# Patient Record
Sex: FEMALE | Race: WHITE | NOT HISPANIC OR LATINO | Employment: FULL TIME | URBAN - NONMETROPOLITAN AREA
[De-identification: names, ages, dates, MRNs, and addresses within clinical notes are randomized per-mention and may not be internally consistent; named-entity substitution may affect disease eponyms.]

---

## 2018-09-27 ENCOUNTER — OFFICE VISIT CONVERTED (OUTPATIENT)
Dept: FAMILY MEDICINE CLINIC | Age: 33
End: 2018-09-27
Attending: NURSE PRACTITIONER

## 2019-02-08 ENCOUNTER — HOSPITAL ENCOUNTER (OUTPATIENT)
Dept: OTHER | Facility: HOSPITAL | Age: 34
Discharge: HOME | End: 2019-02-08

## 2019-02-08 LAB — TSH SERPL-ACNC: 3.43 M[IU]/L (ref 0.27–4.2)

## 2020-09-11 ENCOUNTER — HOSPITAL ENCOUNTER (OUTPATIENT)
Dept: OTHER | Facility: HOSPITAL | Age: 35
Discharge: HOME | End: 2020-09-11

## 2020-09-11 LAB
APPEARANCE UR: CLEAR
BASOPHILS # BLD MANUAL: 0.03 10*3/UL (ref 0–0.2)
BASOPHILS NFR BLD MANUAL: 0.4 % (ref 0–3)
BILIRUB UR QL: NEGATIVE
COLOR UR: YELLOW
CONV BACTERIA: ABNORMAL
CONV COLLECTION SOURCE (UA): ABNORMAL
CONV UROBILINOGEN IN URINE BY AUTOMATED TEST STRIP: 0.2 {EHRLICHU}/DL (ref 0.1–1)
DEPRECATED RDW RBC AUTO: 38.8 FL
EOSINOPHIL # BLD MANUAL: 0.13 10*3/UL (ref 0–0.7)
EOSINOPHIL NFR BLD MANUAL: 1.6 % (ref 0–7)
ERYTHROCYTE [DISTWIDTH] IN BLOOD BY AUTOMATED COUNT: 12.1 % (ref 11.5–14.5)
GLUCOSE UR QL: NEGATIVE MG/DL
GRANS (ABSOLUTE): 4.91 10*3/UL (ref 2–8)
GRANS: 62.3 % (ref 30–85)
HBA1C MFR BLD: 14.6 G/DL (ref 12–16)
HCT VFR BLD AUTO: 43.3 % (ref 37–47)
HGB UR QL STRIP: NEGATIVE
IMM GRANULOCYTES # BLD: 0.01 10*3/UL (ref 0–0.54)
IMM GRANULOCYTES NFR BLD: 0.1 % (ref 0–0.43)
KETONES UR QL STRIP: ABNORMAL MG/DL
LEUKOCYTE ESTERASE UR QL STRIP: NEGATIVE
LYMPHOCYTES # BLD MANUAL: 2.32 10*3/UL (ref 1–5)
LYMPHOCYTES NFR BLD MANUAL: 6.2 % (ref 3–10)
MCH RBC QN AUTO: 29.1 PG (ref 27–31)
MCHC RBC AUTO-ENTMCNC: 33.7 G/DL (ref 33–37)
MCV RBC AUTO: 86.3 FL (ref 81–99)
MONOCYTES # BLD AUTO: 0.49 10*3/UL (ref 0.2–1.2)
NITRITE UR QL STRIP: NEGATIVE
PH UR STRIP.AUTO: 6 [PH] (ref 5–8)
PLATELET # BLD AUTO: 288 10*3/UL (ref 130–400)
PMV BLD AUTO: 10.1 FL (ref 7.4–10.4)
PROT UR QL: NEGATIVE MG/DL
RBC # BLD AUTO: 5.02 10*6/UL (ref 4.2–5.4)
RBC #/AREA URNS HPF: ABNORMAL /[HPF]
SP GR UR: 1.01 (ref 1–1.03)
SQUAMOUS SPT QL MICRO: ABNORMAL /[HPF]
VARIANT LYMPHS NFR BLD MANUAL: 29.4 % (ref 20–45)
WBC # BLD AUTO: 7.89 10*3/UL (ref 4.8–10.8)
WBC #/AREA URNS HPF: ABNORMAL /[HPF]

## 2020-09-12 LAB
25(OH)D3 SERPL-MCNC: 34.1 NG/ML (ref 30–100)
ALBUMIN SERPL-MCNC: 5 G/DL (ref 3.5–5)
ALBUMIN/GLOB SERPL: 1.9 {RATIO} (ref 1.4–2.6)
ALP SERPL-CCNC: 93 U/L (ref 42–98)
ALT SERPL-CCNC: 22 U/L (ref 10–40)
ANION GAP SERPL CALC-SCNC: 17 MMOL/L (ref 8–19)
AST SERPL-CCNC: 21 U/L (ref 15–50)
BILIRUB SERPL-MCNC: 0.54 MG/DL (ref 0.2–1.3)
BUN SERPL-MCNC: 9 MG/DL (ref 5–25)
BUN/CREAT SERPL: 11 {RATIO} (ref 6–20)
CALCIUM SERPL-MCNC: 9.6 MG/DL (ref 8.7–10.4)
CHLORIDE SERPL-SCNC: 99 MMOL/L (ref 99–111)
CHOLEST SERPL-MCNC: 184 MG/DL (ref 107–200)
CHOLEST/HDLC SERPL: 3.3 {RATIO} (ref 3–6)
CONV CO2: 23 MMOL/L (ref 22–32)
CONV TOTAL PROTEIN: 7.7 G/DL (ref 6.3–8.2)
CREAT UR-MCNC: 0.79 MG/DL (ref 0.5–0.9)
EST. AVERAGE GLUCOSE BLD GHB EST-MCNC: 103 MG/DL
FOLATE SERPL-MCNC: 15.3 NG/ML (ref 4.8–20)
GFR SERPLBLD BASED ON 1.73 SQ M-ARVRAT: >60 ML/MIN/{1.73_M2}
GLOBULIN UR ELPH-MCNC: 2.7 G/DL (ref 2–3.5)
GLUCOSE SERPL-MCNC: 78 MG/DL (ref 65–99)
HBA1C MFR BLD: 5.2 % (ref 3.5–5.7)
HDLC SERPL-MCNC: 55 MG/DL (ref 40–60)
LDLC SERPL CALC-MCNC: 114 MG/DL (ref 70–100)
OSMOLALITY SERPL CALC.SUM OF ELEC: 278 MOSM/KG (ref 273–304)
PHOSPHATE SERPL-MCNC: 3.7 MG/DL (ref 2.4–4.5)
POTASSIUM SERPL-SCNC: 3.9 MMOL/L (ref 3.5–5.3)
SODIUM SERPL-SCNC: 135 MMOL/L (ref 135–147)
T4 FREE SERPL-MCNC: 1 NG/DL (ref 0.9–1.8)
TRIGL SERPL-MCNC: 73 MG/DL (ref 40–150)
TSH SERPL-ACNC: 2.31 M[IU]/L (ref 0.27–4.2)
VIT B12 SERPL-MCNC: 546 PG/ML (ref 211–911)
VLDLC SERPL-MCNC: 15 MG/DL (ref 5–37)

## 2020-09-13 LAB — T3FREE SERPL-MCNC: 3 PG/ML (ref 2–4.4)

## 2021-05-18 NOTE — PROGRESS NOTES
Leah Isabel N. 1985     Office/Outpatient Visit    Visit Date: Thu, Sep 27, 2018 04:50 pm    Provider: Mary Granado N.P. (Assistant: Viridiana Abreu RN)    Location: [facility]        Electronically signed by Mary Granado N.P. on  2018 12:30:37 PM                             SUBJECTIVE:        CC:     Emily is a 32 year old White female.  Neck pain;         HPI:         Spinal stenosis: Pt states having an MRI in  d/t neck pain. States symptoms have gotten worse, visual disturbances with seeing stars. Pain is  8/10 pain when resting and feeling like her neck tightens up. States when she is sitting and working on paperwork, her neck feels tight and it drives her crazy.  Pt states having to put pressure to the base of her skull when looking up or when washing her hair. States previously when she would raise her R arm, she would have vision changes seeing stars. Now it is random, cannot identify a trigger.     ROS:     CONSTITUTIONAL:  Negative for chills, fatigue, fever, and weight change.      EYES:  Negative for blurred vision.      CARDIOVASCULAR:  Negative for chest pain, orthopnea, paroxysmal nocturnal dyspnea and pedal edema.      RESPIRATORY:  Negative for dyspnea.      GASTROINTESTINAL:  Negative for abdominal pain, constipation, diarrhea, nausea and vomiting.      MUSCULOSKELETAL:  Positive for neck pain.      NEUROLOGICAL:  Negative for dizziness, headaches, paresthesias, and weakness.      PSYCHIATRIC:  Negative for anxiety, depression, and sleep disturbances.          PMH/FMH/SH:     Last Reviewed on 2018 05:40 PM by Mary Granado    Past Medical History:             GYNECOLOGICAL HISTORY:             Surgical History:     NONE         Family History:         Positive for Coronary Artery Disease and Hypertension.      Positive for Breast Cancer.      Positive for Cerebrovascular Accident ( pat. GM ) and Seizure Disorder.      Positive for  Hypothyroidism.          Social History:     Occupation: a [employer]     Marital Status:          Tobacco/Alcohol/Supplements:     Last Reviewed on 9/27/2018 05:40 PM by Mary Granado    Tobacco: She has never smoked.          Alcohol:  Does not drink alcohol and never has.      Caffeine:  She admits to consuming caffeine via soda ( 1 serving per day ).          Substance Abuse History:     Last Reviewed on 9/27/2018 05:40 PM by Mary Granado        Mental Health History:     Last Reviewed on 9/27/2018 05:40 PM by Mary Granado        Communicable Diseases (eg STDs):     Last Reviewed on 9/27/2018 05:40 PM by Mary Granado            Current Problems:     Last Reviewed on 9/27/2018 05:40 PM by Mary Granado    Bilateral polycystic ovarian syndrome     Use of high risk medications     Overweight         Immunizations:     Fluzone (3 + years dose) 11/1/2017         Allergies:     Last Reviewed on 9/27/2018 05:40 PM by Mary Granado    Amoxicillin:        Current Medications:     Last Reviewed on 9/27/2018 05:40 PM by Mary Granado    None        OBJECTIVE:        Vitals:         Current: 9/27/2018 4:53:56 PM    Ht:  5 ft, 2.5 in;  Wt: 164.6 lbs;  BMI: 29.6    T: 98.5 F (oral);  BP: 132/83 mm Hg (left arm, sitting);  P: 83 bpm (left arm (BP Cuff), sitting);  sCr: 0.77 mg/dL;  GFR: 103.80        Exams:     PHYSICAL EXAM:     GENERAL: vital signs recorded - well developed, well nourished;  no apparent distress;     EYES: extraocular movements intact; conjunctiva and cornea are normal; PERRLA;     NECK: range of motion is normal; thyroid is non-palpable;     RESPIRATORY: normal respiratory rate and pattern with no distress; normal breath sounds with no rales, rhonchi, wheezes or rubs;     CARDIOVASCULAR: normal rate; rhythm is regular;  no systolic murmur; no edema;     GASTROINTESTINAL: nontender; normal bowel sounds; no organomegaly;     MUSCULOSKELETAL: Neck tenderness, limited ROM;      NEUROLOGICAL:  cranial nerves, motor and sensory function, reflexes, gait and coordination are all intact;     PSYCHIATRIC:  appropriate affect and demeanor; normal speech pattern; grossly normal memory;         ASSESSMENT:           723.0   M48.02  Cervical spinal stenosis              DDx:     V77.91   Z13.220  Screening for hyperlipidemia              DDx:         ORDERS:         Meds Prescribed:       Naproxen 500mg Tablet 1 prn  #30 (Thirty) tablet(s) Refills: 1       Medrol (Methylprednisolone) 4mg Dosepak Take as directed with food  #1 (One) dose pack Refills: 0         Radiology/Test Orders:       66522  Magnetic resonance imaging, spinal canal and contents, cervical; without contrast  (Send-Out)           Lab Orders:       [id number]  [facility] CBC with 3 part diff  (Send-Out)         59247  [facility] Comp. Metabolic Panel  (Send-Out)         40264  [facility] TSH  (Send-Out)         78426  [facility] Lipid Panel  (Send-Out)                   PLAN:          Cervical spinal stenosis     LABORATORY:  Labs ordered to be performed today include CBC, Comprehensive metabolic panel, and TSH.      RADIOLOGY:  I have ordered MRI cervical spine w/o contrast to be done today.            Prescriptions:       Naproxen 500mg Tablet 1 prn  #30 (Thirty) tablet(s) Refills: 1       Medrol (Methylprednisolone) 4mg Dosepak Take as directed with food  #1 (One) dose pack Refills: 0           Orders:       29852  Magnetic resonance imaging, spinal canal and contents, cervical; without contrast  (Send-Out)         29000  [facility] CBC with 3 part diff  (Send-Out)         28181  [facility] Comp. Metabolic Panel  (Send-Out)         51665  [facility] TSH  (Send-Out)             Patient Education Handouts:       Spinal Stenosis           Screening for hyperlipidemia           Orders:       53065  [facility] Lipid Panel  (Send-Out)               CHARGE CAPTURE:           Primary Diagnosis:     723.0 Cervical spinal stenosis             M48.02    Spinal stenosis, cervical region              Orders:          15262   Office/outpatient visit; established patient, level 3  (In-House)           V77.91 Screening for hyperlipidemia            Z13.220    Encounter for screening for lipoid disorders        ADDENDUMS:      ____________________________________    Date: 10/10/2018 03:57 PM    Author: Cassandra Beach         Visit Note Faxed to:        Amrita Jackson  (Neurology); Number [phone]

## 2021-07-01 VITALS
HEIGHT: 63 IN | BODY MASS INDEX: 29.16 KG/M2 | DIASTOLIC BLOOD PRESSURE: 83 MMHG | SYSTOLIC BLOOD PRESSURE: 132 MMHG | WEIGHT: 164.6 LBS | TEMPERATURE: 98.5 F | HEART RATE: 83 BPM

## 2022-05-27 ENCOUNTER — LAB (OUTPATIENT)
Dept: LAB | Facility: HOSPITAL | Age: 37
End: 2022-05-27

## 2022-05-27 ENCOUNTER — OFFICE VISIT (OUTPATIENT)
Dept: FAMILY MEDICINE CLINIC | Age: 37
End: 2022-05-27

## 2022-05-27 VITALS
WEIGHT: 194.8 LBS | SYSTOLIC BLOOD PRESSURE: 136 MMHG | BODY MASS INDEX: 35.85 KG/M2 | DIASTOLIC BLOOD PRESSURE: 90 MMHG | HEIGHT: 62 IN | TEMPERATURE: 98.7 F | HEART RATE: 90 BPM

## 2022-05-27 DIAGNOSIS — Z11.59 SCREENING FOR VIRAL DISEASE: ICD-10-CM

## 2022-05-27 DIAGNOSIS — Z00.00 ANNUAL PHYSICAL EXAM: ICD-10-CM

## 2022-05-27 DIAGNOSIS — Z00.00 ANNUAL PHYSICAL EXAM: Primary | ICD-10-CM

## 2022-05-27 LAB
BASOPHILS # BLD AUTO: 0.02 10*3/MM3 (ref 0–0.2)
BASOPHILS NFR BLD AUTO: 0.5 % (ref 0–1.5)
DEPRECATED RDW RBC AUTO: 42.1 FL (ref 37–54)
EOSINOPHIL # BLD AUTO: 0.26 10*3/MM3 (ref 0–0.4)
EOSINOPHIL NFR BLD AUTO: 6.1 % (ref 0.3–6.2)
ERYTHROCYTE [DISTWIDTH] IN BLOOD BY AUTOMATED COUNT: 12.9 % (ref 12.3–15.4)
HCT VFR BLD AUTO: 40.4 % (ref 34–46.6)
HGB BLD-MCNC: 13.1 G/DL (ref 12–15.9)
IMM GRANULOCYTES # BLD AUTO: 0.01 10*3/MM3 (ref 0–0.05)
IMM GRANULOCYTES NFR BLD AUTO: 0.2 % (ref 0–0.5)
LYMPHOCYTES # BLD AUTO: 1.42 10*3/MM3 (ref 0.7–3.1)
LYMPHOCYTES NFR BLD AUTO: 33.3 % (ref 19.6–45.3)
MCH RBC QN AUTO: 28.8 PG (ref 26.6–33)
MCHC RBC AUTO-ENTMCNC: 32.4 G/DL (ref 31.5–35.7)
MCV RBC AUTO: 88.8 FL (ref 79–97)
MONOCYTES # BLD AUTO: 0.59 10*3/MM3 (ref 0.1–0.9)
MONOCYTES NFR BLD AUTO: 13.8 % (ref 5–12)
NEUTROPHILS NFR BLD AUTO: 1.97 10*3/MM3 (ref 1.7–7)
NEUTROPHILS NFR BLD AUTO: 46.1 % (ref 42.7–76)
PLATELET # BLD AUTO: 187 10*3/MM3 (ref 140–450)
PMV BLD AUTO: 10.1 FL (ref 6–12)
RBC # BLD AUTO: 4.55 10*6/MM3 (ref 3.77–5.28)
WBC NRBC COR # BLD: 4.27 10*3/MM3 (ref 3.4–10.8)

## 2022-05-27 PROCEDURE — 80061 LIPID PANEL: CPT

## 2022-05-27 PROCEDURE — 86803 HEPATITIS C AB TEST: CPT

## 2022-05-27 PROCEDURE — 80050 GENERAL HEALTH PANEL: CPT

## 2022-05-27 PROCEDURE — 36415 COLL VENOUS BLD VENIPUNCTURE: CPT

## 2022-05-27 PROCEDURE — 99385 PREV VISIT NEW AGE 18-39: CPT | Performed by: NURSE PRACTITIONER

## 2022-05-27 RX ORDER — BACLOFEN 10 MG/1
TABLET ORAL
COMMUNITY
Start: 2022-05-07 | End: 2022-05-27

## 2022-05-27 RX ORDER — IBUPROFEN 800 MG/1
TABLET ORAL
COMMUNITY
Start: 2022-05-07 | End: 2022-05-27

## 2022-05-27 RX ORDER — PREDNISONE 10 MG/1
TABLET ORAL
COMMUNITY
Start: 2022-05-07 | End: 2022-05-27

## 2022-05-27 RX ORDER — FLUCONAZOLE 100 MG/1
TABLET ORAL
COMMUNITY
Start: 2022-05-09 | End: 2022-05-27

## 2022-05-27 NOTE — PROGRESS NOTES
Chief Complaint  Isabel Lynn presents to [facility] for Establish Care    Subjective          History of Present Illness    Aylin is here today to establish care.   She has had covid vaccine X 2.  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   Reports last pap smear May 2021 with Dr Gillis was normal. Has had abnormals in the past.   Never smoker.   She last had lab work in  which showed some elevation to her cholesterol ([id number]). Additionally, notes that her diastolic blood pressure has been in the 90s some.     Review of Systems   Constitutional: Negative for chills and fever.   HENT: Negative for ear pain and sore throat.    Eyes: Negative for blurred vision and redness.   Respiratory: Negative for cough, shortness of breath and wheezing.    Cardiovascular: Negative for chest pain and palpitations.   Gastrointestinal: Negative for abdominal pain, diarrhea and vomiting.   Genitourinary: Negative for frequency and urgency.   Musculoskeletal: Negative for joint swelling.   Skin: Negative for rash.   Neurological: Negative for dizziness and headache.   Psychiatric/Behavioral: Negative for suicidal ideas and depressed mood.         Allergies   Allergen Reactions   • Penicillins Hives      Past Medical History:   Diagnosis Date   • History of medical problems     Degenerative Disk Disease     No current outpatient medications on file.     No current facility-administered medications for this visit.     Past Surgical History:   Procedure Laterality Date   •  SECTION  , ,    • CHOLECYSTECTOMY     • ENDOMETRIAL ABLATION     • TUBAL ABDOMINAL LIGATION        Social History     Tobacco Use   • Smoking status: Never Smoker   • Smokeless tobacco: Never Used   Substance Use Topics   • Alcohol use: Not Currently     Comment: None   • Drug use: Never     Family History   Problem Relation Age of Onset   • Hyperlipidemia  "Mother      Health Maintenance Due   Topic Date Due   • ANNUAL PHYSICAL  Never done   • HEPATITIS C SCREENING  Never done      Immunization History   Administered Date(s) Administered   • COVID-19 (PFIZER) PURPLE CAP 08/03/2021, 08/24/2021        Objective     Vitals:    05/27/22 1304   BP: 136/90   BP Location: Left arm   Patient Position: Sitting   Pulse: 90   Temp: 98.7 °F (37.1 °C)   TempSrc: Oral   Weight: 88.4 kg (194 lb 12.8 oz)   Height: 157.5 cm (62\")     Body mass index is 35.63 kg/m².     Physical Exam  Vitals reviewed.   Constitutional:       General: She is not in acute distress.     Appearance: Normal appearance. She is well-developed.   HENT:      Head: Normocephalic and atraumatic.      Right Ear: Hearing, tympanic membrane and ear canal normal.      Left Ear: Hearing, tympanic membrane and ear canal normal.      Mouth/Throat:      Mouth: Mucous membranes are moist.   Eyes:      Extraocular Movements: Extraocular movements intact.      Pupils: Pupils are equal, round, and reactive to light.   Cardiovascular:      Rate and Rhythm: Normal rate and regular rhythm.      Pulses: Normal pulses.      Heart sounds: Normal heart sounds.   Pulmonary:      Effort: Pulmonary effort is normal.      Breath sounds: Normal breath sounds.   Abdominal:      General: Bowel sounds are normal.      Palpations: Abdomen is soft.      Tenderness: There is no abdominal tenderness.   Musculoskeletal:         General: Normal range of motion.      Cervical back: Normal range of motion and neck supple.   Skin:     General: Skin is warm and dry.   Neurological:      Mental Status: She is alert and oriented to person, place, and time.   Psychiatric:         Mood and Affect: Mood normal.           Result Review :     The following data was reviewed by: PRADIP Jacobson on 05/27/2022:          Urinalysis With Microscopic - (09/11/2020 16:17)  Phosphorus (09/11/2020 16:17)  Hemoglobin A1C With EAG (09/11/2020 16:17)  CBC & " Differential (09/11/2020 16:17)  Vitamin D 25 Hydroxy (09/11/2020 16:17)  T3, Free (09/11/2020 16:17)  Vitamin B12 & Folate (09/11/2020 16:17)  Thyroid Profile (Free T4 with TSH) (09/11/2020 16:17)  Lipid Panel (09/11/2020 16:17)  Comprehensive Metabolic Panel (09/11/2020 16:17)                  Assessment and Plan      Diagnoses and all orders for this visit:    1. Annual physical exam (Primary)  Comments:  Counseled health maintenance recommendations. She will check with HR re Tdap vaccine and with Dr Green re pap smear.   Orders:  -     CBC & Differential; Future  -     Comprehensive Metabolic Panel; Future  -     Lipid Panel; Future  -     TSH; Future    2. Screening for viral disease  -     Hepatitis C antibody; Future      Will recheck labs today to follow on cholesterol and other routine labs. She is fasting at time of appointment.  Will have her monitor BP and follow up in 6 months (or sooner as needed) for review of elevated blood pressure.           Follow Up     Return in about 6 months (around 11/27/2022) for Recheck.

## 2022-05-28 LAB
ALBUMIN SERPL-MCNC: 4.5 G/DL (ref 3.5–5.2)
ALBUMIN/GLOB SERPL: 2 G/DL
ALP SERPL-CCNC: 94 U/L (ref 39–117)
ALT SERPL W P-5'-P-CCNC: 38 U/L (ref 1–33)
ANION GAP SERPL CALCULATED.3IONS-SCNC: 8.7 MMOL/L (ref 5–15)
AST SERPL-CCNC: 24 U/L (ref 1–32)
BILIRUB SERPL-MCNC: 0.2 MG/DL (ref 0–1.2)
BUN SERPL-MCNC: 8 MG/DL (ref 6–20)
BUN/CREAT SERPL: 11.4 (ref 7–25)
CALCIUM SPEC-SCNC: 9.2 MG/DL (ref 8.6–10.5)
CHLORIDE SERPL-SCNC: 104 MMOL/L (ref 98–107)
CHOLEST SERPL-MCNC: 153 MG/DL (ref 0–200)
CO2 SERPL-SCNC: 24.3 MMOL/L (ref 22–29)
CREAT SERPL-MCNC: 0.7 MG/DL (ref 0.57–1)
EGFRCR SERPLBLD CKD-EPI 2021: 113.7 ML/MIN/1.73
GLOBULIN UR ELPH-MCNC: 2.3 GM/DL
GLUCOSE SERPL-MCNC: 83 MG/DL (ref 65–99)
HCV AB SER DONR QL: NORMAL
HDLC SERPL-MCNC: 54 MG/DL (ref 40–60)
LDLC SERPL CALC-MCNC: 85 MG/DL (ref 0–100)
LDLC/HDLC SERPL: 1.58 {RATIO}
POTASSIUM SERPL-SCNC: 4.4 MMOL/L (ref 3.5–5.2)
PROT SERPL-MCNC: 6.8 G/DL (ref 6–8.5)
SODIUM SERPL-SCNC: 137 MMOL/L (ref 136–145)
TRIGL SERPL-MCNC: 69 MG/DL (ref 0–150)
TSH SERPL DL<=0.05 MIU/L-ACNC: 3.42 UIU/ML (ref 0.27–4.2)
VLDLC SERPL-MCNC: 14 MG/DL (ref 5–40)

## 2022-10-14 ENCOUNTER — OFFICE VISIT (OUTPATIENT)
Dept: FAMILY MEDICINE CLINIC | Age: 37
End: 2022-10-14

## 2022-10-14 VITALS
TEMPERATURE: 98.3 F | HEIGHT: 62 IN | HEART RATE: 67 BPM | DIASTOLIC BLOOD PRESSURE: 83 MMHG | BODY MASS INDEX: 34.41 KG/M2 | SYSTOLIC BLOOD PRESSURE: 145 MMHG | WEIGHT: 187 LBS

## 2022-10-14 DIAGNOSIS — I10 PRIMARY HYPERTENSION: Primary | ICD-10-CM

## 2022-10-14 PROCEDURE — 99214 OFFICE O/P EST MOD 30 MIN: CPT | Performed by: NURSE PRACTITIONER

## 2022-10-14 RX ORDER — MULTIVIT,IRON,MINERALS/LUTEIN
TABLET ORAL 3 TIMES DAILY
COMMUNITY
Start: 2022-01-01

## 2022-10-14 RX ORDER — ATENOLOL 25 MG/1
25 TABLET ORAL DAILY
Qty: 90 TABLET | Refills: 1 | Status: SHIPPED | OUTPATIENT
Start: 2022-10-14

## 2022-10-14 RX ORDER — AMOXICILLIN 500 MG
1200 CAPSULE ORAL 3 TIMES DAILY
COMMUNITY
Start: 2022-01-01

## 2022-10-14 NOTE — PROGRESS NOTES
Chief Complaint  Isabel Lynn presents to [facility] for Hypertension (6 month follow up )    Subjective          History of Present Illness    Aylin is here today to follow up on her blood pressure. She had dizzy episode at work on 10/5/22 with /116. She has been checking her BP at work and readings range typically upper 130s-140s/ upper 80s/90s. She is not currently on any medications for her BP.     Review of Systems      Allergies   Allergen Reactions   • Penicillins Hives      Past Medical History:   Diagnosis Date   • History of medical problems     Degenerative Disk Disease     Current Outpatient Medications   Medication Sig Dispense Refill   • Biotin 5000 MCG capsule Take 5,000 mcg by mouth 4 (Four) Times a Day.     • Multiple Minerals-Vitamins (Calcium-Magnesium-Zinc-D3) tablet Take  by mouth 3 (Three) Times a Day.     • Omega-3 Fatty Acids (fish oil) 1200 MG capsule capsule Take 1 capsule by mouth 3 (Three) Times a Day.     • atenolol (TENORMIN) 25 MG tablet Take 1 tablet by mouth Daily. 90 tablet 1     No current facility-administered medications for this visit.     Past Surgical History:   Procedure Laterality Date   •  SECTION  , ,    • CHOLECYSTECTOMY     • ENDOMETRIAL ABLATION     • TUBAL ABDOMINAL LIGATION        Social History     Tobacco Use   • Smoking status: Never   • Smokeless tobacco: Never   Substance Use Topics   • Alcohol use: Not Currently     Comment: None   • Drug use: Never     Family History   Problem Relation Age of Onset   • Hyperlipidemia Mother      There are no preventive care reminders to display for this patient.   Immunization History   Administered Date(s) Administered   • COVID-19 (PFIZER) PURPLE CAP 2021, 2021        Objective     Vitals:    10/14/22 1404 10/14/22 1410   BP: 142/68 145/83   BP Location: Left arm Right arm   Patient Position: Sitting Sitting   Pulse: 76 67   Temp:  "98.3 °F (36.8 °C)    TempSrc: Oral    Weight: 84.8 kg (187 lb)    Height: 157.5 cm (62.01\")      Body mass index is 34.19 kg/m².     Physical Exam  Vitals reviewed.   Constitutional:       General: She is not in acute distress.     Appearance: Normal appearance. She is well-developed.   HENT:      Head: Normocephalic and atraumatic.   Cardiovascular:      Rate and Rhythm: Normal rate and regular rhythm.   Pulmonary:      Effort: Pulmonary effort is normal.      Breath sounds: Normal breath sounds.   Neurological:      Mental Status: She is alert and oriented to person, place, and time.   Psychiatric:         Mood and Affect: Mood and affect normal.           Result Review :     The following data was reviewed by: PRADIP Jacobson on 10/14/2022:          Hepatitis C antibody (05/27/2022 14:03)  TSH (05/27/2022 14:03)  Lipid Panel (05/27/2022 14:03)  Comprehensive Metabolic Panel (05/27/2022 14:03)  CBC & Differential (05/27/2022 14:03)                  Assessment and Plan      Diagnoses and all orders for this visit:    1. Primary hypertension (Primary)  Comments:  Will start low dose atenolol. Call for any concerns. Continue BP log. Healthy diet and exercise.   Orders:  -     atenolol (TENORMIN) 25 MG tablet; Take 1 tablet by mouth Daily.  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           Follow Up     Return in about 6 months (around 4/14/2023) for Recheck.             "

## 2022-11-23 DIAGNOSIS — I10 PRIMARY HYPERTENSION: Primary | ICD-10-CM

## 2022-11-23 RX ORDER — LOSARTAN POTASSIUM 25 MG/1
25 TABLET ORAL DAILY
Qty: 90 TABLET | Refills: 1 | Status: SHIPPED | OUTPATIENT
Start: 2022-11-23

## 2023-04-07 ENCOUNTER — HOSPITAL ENCOUNTER (OUTPATIENT)
Dept: GENERAL RADIOLOGY | Facility: HOSPITAL | Age: 38
Discharge: HOME | End: 2023-04-07
Payer: COMMERCIAL

## 2023-04-07 ENCOUNTER — OFFICE VISIT (OUTPATIENT)
Dept: FAMILY MEDICINE CLINIC | Age: 38
End: 2023-04-07
Payer: COMMERCIAL

## 2023-04-07 ENCOUNTER — LAB (OUTPATIENT)
Dept: LAB | Facility: HOSPITAL | Age: 38
End: 2023-04-07
Payer: COMMERCIAL

## 2023-04-07 VITALS
BODY MASS INDEX: 38.16 KG/M2 | HEIGHT: 62 IN | DIASTOLIC BLOOD PRESSURE: 67 MMHG | TEMPERATURE: 98.9 F | SYSTOLIC BLOOD PRESSURE: 120 MMHG | HEART RATE: 94 BPM | WEIGHT: 207.4 LBS

## 2023-04-07 DIAGNOSIS — Z13.6 SCREENING FOR CARDIOVASCULAR CONDITION: ICD-10-CM

## 2023-04-07 DIAGNOSIS — I10 PRIMARY HYPERTENSION: Primary | ICD-10-CM

## 2023-04-07 DIAGNOSIS — R20.2 PARESTHESIA OF RIGHT UPPER EXTREMITY: ICD-10-CM

## 2023-04-07 DIAGNOSIS — M54.2 CERVICALGIA: ICD-10-CM

## 2023-04-07 LAB
ALBUMIN SERPL-MCNC: 4.4 G/DL (ref 3.5–5.2)
ALBUMIN/GLOB SERPL: 2.1 G/DL
ALP SERPL-CCNC: 105 U/L (ref 39–117)
ALT SERPL W P-5'-P-CCNC: 29 U/L (ref 1–33)
ANION GAP SERPL CALCULATED.3IONS-SCNC: 15.5 MMOL/L (ref 5–15)
AST SERPL-CCNC: 23 U/L (ref 1–32)
BILIRUB SERPL-MCNC: 0.2 MG/DL (ref 0–1.2)
BUN SERPL-MCNC: 12 MG/DL (ref 6–20)
BUN/CREAT SERPL: 16.4 (ref 7–25)
CALCIUM SPEC-SCNC: 9.4 MG/DL (ref 8.6–10.5)
CHLORIDE SERPL-SCNC: 97 MMOL/L (ref 98–107)
CHOLEST SERPL-MCNC: 184 MG/DL (ref 0–200)
CO2 SERPL-SCNC: 25.5 MMOL/L (ref 22–29)
CREAT SERPL-MCNC: 0.73 MG/DL (ref 0.57–1)
DEPRECATED RDW RBC AUTO: 39.7 FL (ref 37–54)
EGFRCR SERPLBLD CKD-EPI 2021: 107.4 ML/MIN/1.73
ERYTHROCYTE [DISTWIDTH] IN BLOOD BY AUTOMATED COUNT: 12.1 % (ref 12.3–15.4)
GLOBULIN UR ELPH-MCNC: 2.1 GM/DL
GLUCOSE SERPL-MCNC: 110 MG/DL (ref 65–99)
HCT VFR BLD AUTO: 39.9 % (ref 34–46.6)
HDLC SERPL-MCNC: 52 MG/DL (ref 40–60)
HGB BLD-MCNC: 13.2 G/DL (ref 12–15.9)
LDLC SERPL CALC-MCNC: 114 MG/DL (ref 0–100)
LDLC/HDLC SERPL: 2.16 {RATIO}
MCH RBC QN AUTO: 29.1 PG (ref 26.6–33)
MCHC RBC AUTO-ENTMCNC: 33.1 G/DL (ref 31.5–35.7)
MCV RBC AUTO: 87.9 FL (ref 79–97)
PLATELET # BLD AUTO: 301 10*3/MM3 (ref 140–450)
PMV BLD AUTO: 10.2 FL (ref 6–12)
POTASSIUM SERPL-SCNC: 4.6 MMOL/L (ref 3.5–5.2)
PROT SERPL-MCNC: 6.5 G/DL (ref 6–8.5)
RBC # BLD AUTO: 4.54 10*6/MM3 (ref 3.77–5.28)
SODIUM SERPL-SCNC: 138 MMOL/L (ref 136–145)
TRIGL SERPL-MCNC: 98 MG/DL (ref 0–150)
VLDLC SERPL-MCNC: 18 MG/DL (ref 5–40)
WBC NRBC COR # BLD: 7.16 10*3/MM3 (ref 3.4–10.8)

## 2023-04-07 PROCEDURE — 80053 COMPREHEN METABOLIC PANEL: CPT

## 2023-04-07 PROCEDURE — 72040 X-RAY EXAM NECK SPINE 2-3 VW: CPT

## 2023-04-07 PROCEDURE — 85027 COMPLETE CBC AUTOMATED: CPT

## 2023-04-07 PROCEDURE — 80061 LIPID PANEL: CPT

## 2023-04-07 PROCEDURE — 36415 COLL VENOUS BLD VENIPUNCTURE: CPT

## 2023-04-07 PROCEDURE — 99214 OFFICE O/P EST MOD 30 MIN: CPT | Performed by: NURSE PRACTITIONER

## 2023-04-07 RX ORDER — LOSARTAN POTASSIUM 25 MG/1
25 TABLET ORAL DAILY
Qty: 90 TABLET | Refills: 1 | Status: SHIPPED | OUTPATIENT
Start: 2023-04-07

## 2023-04-07 NOTE — PROGRESS NOTES
Chief Complaint  Isabel Lynn presents to [facility] for Hypertension    Subjective          History of Present Illness    Aylin is here today to follow up on hypertension. Current medication includes Losartan 25 mg daily. Unable to tolerate atenolol secondary to bradycardia. She has been checking her BP at home and it has been running similar to today's reading.   She reports history of neck injury due to [employer] reyes accident. . She reports previously told that she had pinched nerve. She notes right upper extremity numbness and tingling recently. Denies neck pain recently. She previously had nerve conduction studies around 2018/ that she reports did not show carpal tunnel syndrome. She admits to sleeping on right arm and waking up with pain. Was previously referred by past provider to neurosurgery but did not ever see.     Review of Systems      Allergies   Allergen Reactions   • Penicillins Hives      Past Medical History:   Diagnosis Date   • History of medical problems     Degenerative Disk Disease   • Hyperlipidemia    • Hypertension      Current Outpatient Medications   Medication Sig Dispense Refill   • Biotin 5000 MCG capsule Take 5,000 mcg by mouth 4 (Four) Times a Day.     • losartan (Cozaar) 25 MG tablet Take 1 tablet by mouth Daily. 90 tablet 1   • Omega-3 Fatty Acids (fish oil) 1200 MG capsule capsule Take 1 capsule by mouth 2 (Two) Times a Day With Meals.       No current facility-administered medications for this visit.     Past Surgical History:   Procedure Laterality Date   •  SECTION  , ,    • CHOLECYSTECTOMY     • ENDOMETRIAL ABLATION     • TUBAL ABDOMINAL LIGATION        Social History     Tobacco Use   • Smoking status: Never     Passive exposure: Never   • Smokeless tobacco: Never   Substance Use Topics   • Alcohol use: Not Currently     Comment: None   • Drug use: Never     Family History   Problem Relation  "Age of Onset   • Hyperlipidemia Mother      There are no preventive care reminders to display for this patient.   Immunization History   Administered Date(s) Administered   • COVID-19 (PFIZER) PURPLE CAP 08/03/2021, 08/24/2021   • FluLaval/Fluzone >6mos 10/27/2022        Objective     Vitals:    04/07/23 0843   BP: 120/67   BP Location: Left arm   Patient Position: Sitting   Cuff Size: Large Adult   Pulse: 94   Temp: 98.9 °F (37.2 °C)   TempSrc: Oral   Weight: 94.1 kg (207 lb 6.4 oz)   Height: 157.5 cm (62.01\")     Body mass index is 37.92 kg/m².     Physical Exam  Vitals reviewed.   Constitutional:       General: She is not in acute distress.     Appearance: Normal appearance. She is well-developed.   HENT:      Head: Normocephalic and atraumatic.   Cardiovascular:      Rate and Rhythm: Normal rate and regular rhythm.   Pulmonary:      Effort: Pulmonary effort is normal.      Breath sounds: Normal breath sounds.   Musculoskeletal:         General: Normal range of motion.   Neurological:      Mental Status: She is alert and oriented to person, place, and time.   Psychiatric:         Mood and Affect: Mood and affect normal.           Result Review :                               Assessment and Plan      Diagnoses and all orders for this visit:    1. Primary hypertension (Primary)  Comments:  Medical condition is stable.  Continue same therapy.  Will recheck at next regular appointment  Orders:  -     losartan (Cozaar) 25 MG tablet; Take 1 tablet by mouth Daily.  Dispense: 90 tablet; Refill: 1    2. Screening for cardiovascular condition  Comments:  Fasting for labs today  Orders:  -     Lipid panel; Future  -     Comprehensive metabolic panel; Future  -     CBC No Differential; Future    3. Cervicalgia  Comments:  Repeating imaging today. Consider physical therapy.   Orders:  -     XR Spine Cervical 2 or 3 View; Future    4. Paresthesia of right upper extremity  Comments:  Discussed wearing brace at HS. Imaging of " cspine today.              Follow Up     No follow-ups on file.

## 2023-09-15 ENCOUNTER — E-VISIT (OUTPATIENT)
Dept: FAMILY MEDICINE CLINIC | Facility: TELEHEALTH | Age: 38
End: 2023-09-15

## 2023-09-15 PROCEDURE — BRIGHTMDVISIT: Performed by: NURSE PRACTITIONER

## 2023-09-15 NOTE — EXTERNAL PATIENT INSTRUCTIONS
Note   increase water PCP if symptoms persist ER for worsening symptoms such as high fever, abdominal or back pain or nausea vomiting.   Diagnosis   Urinary tract infection (UTI)   My name is PRADIP Crane. I'm a healthcare provider at [facility]. After reviewing your interview, I see you have a urinary tract infection (UTI).   Medications   Your pharmacy   [facility] [id number] [facility] [address] [phone]     Prescription   Nitrofurantoin monohydrate/macrocrystalline (100mg): Take 1 capsule by mouth every 12 hours for 5 days for infection. This medication is an antibiotic. Take it exactly as directed. You must finish the entire course of medication, even if you feel better after taking the first few doses.   Phenazopyridine (200mg): Take 1 tablet by mouth 3 times a day as needed for 2 days for pain or discomfort associated with your condition.    I've given you a prescription dose of phenazopyridine. If it's more affordable or convenient, you may use the equivalent amount of non-prescription phenazopyridine. For example, instead of taking one 200 mg phenazopyridine tablet, you may take two 95 mg phenazopyridine tablets.   About your diagnosis   A UTI is an infection of one or more parts of the urinary tract, most commonly the bladder.   Most UTIs are caused by bacteria (usually E. coli) that travel up the urethra and into the bladder. I see that you have some common signs and symptoms of a UTI:    Pain or burning while urinating    Frequent urination    Sudden urge to urinate    Symptoms that feel a lot like past UTIs    Cloudy urine    Strange or strong smelling urine    Symptoms that began shortly after sexual intercourse   Fortunately, most UTIs aren't serious, and they're easily treated with antibiotics. Make sure you take all of the antibiotic pills given to you, even if you start to feel better after the first few doses. Otherwise, the UTI might come back.   What to  expect   If you follow this treatment plan, you should start to feel better within 1 to 2 days.   When to seek care   Call us at [phone]   with any sudden or unexpected symptoms.    Symptoms that don't improve or get worse in the next 48 hours    Fever that goes above 101F or lasts longer than 24 hours    Shaking or chills    Nausea or vomiting    Severe flank pain (pain in your back or side)   Other treatment    Rest and drink plenty of water    Urinate frequently and when you first feel the urge    Place a heating pad on your back or stomach to help relieve some of the discomfort   Prevention    Drink a lot of liquids to help flush bacteria from your system. Water is best. Try for six to eight, 8-ounce glasses a day on a regular basis.    Urinate often and when you first feel the urge. Bacteria can grow when urine stays in the bladder too long. Urinate after sex to flush away bacteria.    After using the toilet, always wipe from front to back. This step is most important after a bowel movement. Wiping from front to back prevents bacteria normally found in stool from entering the urinary tract.   Your provider   Your diagnosis was provided by PRADIP Crane, a member of your trusted care team at [facility].   If you have any questions, call us at [phone]  .

## 2023-09-15 NOTE — E-VISIT TREATED
Chief Complaint: Bladder infection (UTI)   Patient introduction   Patient is 37-year-old female. Patient provided the following organ inventory: Presence of a vagina, ovaries, a uterus, and breasts.   Patient has had dysuria, frequent urination, and urinary urgency for 1 to 3 days.   Urine is cloudy with a strong or pungent odor.   General presentation   Patient has not had a fever. No nausea or vomiting.   No stomach/pelvic pain.   No back pain.   No flank pain.   The following treatments were helpful for current symptoms:    Ibuprofen   Previous history of UTI. Current symptoms feel exactly the same as previous UTIs. Received treatment for UTI 0 times in last year.   No known history of yeast infections as a result of taking antibiotics for past UTIs.   No history of pyelonephritis. No history of kidney stones.   Had sexual intercourse in the past week. Does not use diaphragm. No unprotected sexual intercourse with a new partner in the last 2 weeks.   Patient is not being treated for diabetes mellitus.   Review of red flags/alarm symptoms:    No recent hospitalizations or nursing home care (last 3 months)    No history of renal failure    No recent history of urologic instrumentation    No anatomic abnormalities of the urinary tract    No abnormal vaginal discharge    No visible vaginal sores    No pain with sexual intercourse    No abnormal vaginal bleeding or spotting   Pregnancy/menstrual status/breastfeeding:   Patient is not pregnant. Patient is not breastfeeding. Regarding date of last menstrual period, patient writes: Labor day weekend.   Current medications   Currently taking losartan 25 MG tablet and Biotin 5000 MCG capsule.   Medication allergies   None.   Medication contraindication review   Not taking ACE inhibitors and ARBs.   Patient is being treated for high blood pressure. Therefore, the following medication(s) will not be prescribed:    Ciprofloxacin   No known history of  amoxicillin-clavulanate-associated cholestatic jaundice or nitrofurantoin-associated cholestatic jaundice.   Past medical history   Immune conditions: No immunocompromising conditions.   No history of cancer.   Patient-submitted comments   Patient was asked if they had anything to add about their symptoms. Patient writes: No.   Patient did not request an excuse note.   Assessment   Uncomplicated acute UTI.   This is the likely diagnosis based on patient's interview responses, including:    Symptom profile    Previous history of UTI    Current symptoms are exactly the same as previous UTIs   No recent history of kidney stones.   Plan   Medications:    nitrofurantoin monohydrate/macrocrystals 100 mg capsule RX 100mg 1 cap PO q12h 5d for infection. This medication is an antibiotic. Take it exactly as directed. You must finish the entire course of medication, even if you feel better after taking the first few doses. Amount is 10 cap.    phenazopyridine 200 mg tablet RX 200mg 1 tab PO tid PRN 2d for pain or discomfort associated with your condition. Amount is 6 tab.   The patient's prescriptions will be sent to:   [facility] [id number] [facility] [address]   Phone: [phone]     Fax: [phone]   Education:    Condition and causes    Prevention    Treatment and self-care    When to call provider   Follow-up:   Patient to follow up as needed for progression or lack of improvement in symptoms within 3d.   ----------   Electronically signed by PRADIP Crane on 2023-09-15 at 06:00AM   ----------   Patient Interview Transcript:   Knowing about your anatomy is important for diagnosing and treating UTIs. The gender we have on file for you is female, but we realize that this might not tell the whole story. Would you like to tell us more about your anatomy?    Yes   Not selected:    No   OK, which of these do you have? Select all that apply.    Vagina    Ovaries    Uterus    Breasts   Not  selected:    Penis    Testes    Prostate   Which of these symptoms do you have? Select all that apply.    Pain or burning while urinating    Frequent urination    Sudden urge to urinate and it's hard to hold the urine in   How long have you had these symptoms? Select one.    1 to 3 days   Not selected:    Less than 24 hours    4 to 6 days    7 to 10 days    More than 10 days   Since your current symptoms started, has it been difficult to start, stop, or delay urination? Select one.    No   Not selected:    Yes   What color is your urine? Select one.    Cloudy   Not selected:    Clear    Yellow    Pink or red   Does your urine smell strange (like ammonia) or stronger than usual? Select one.    Yes   Not selected:    No   Do you also have any of these symptoms? Select all that apply.    No   Not selected:    Fever    Nausea    Vomiting    Pain, pressure, or discomfort in the lower abdomen    Back pain   Do you have any flank pain? The flank is the side of the body between the ribs and the hips.    No   Not selected:    Yes, in my left flank    Yes, in my right flank    Yes, in both my left and right flanks   Do you have any of these vaginal symptoms? Select all that apply.    No   Not selected:    Abnormal vaginal itching    Unscheduled or abnormal vaginal bleeding or spotting    Pain during sex    Visible sores on the vagina    Abnormal vaginal discharge   In the past 2 weeks, have you had a medical device or instrument placed in your urinary tract? Examples include catheters, stents, and nephrostomy tubes. Select one.    No   Not selected:    Yes   Have you recently been hospitalized or been a resident of a nursing home or other long-term care facility? This doesn't include emergency room (ER) visits. Select one.    No   Not selected:    Yes, within the last 2 weeks    Yes, within the last 3 months   Have you ever had severe problems with your kidneys, such as kidney failure? Select one.    No, not that I recall    Not selected:    Yes   Kidney stones    No   Not selected:    Within the last year    More than a year ago   Kidney infection (pyelonephritis)    No   Not selected:    Within the last year    More than a year ago   Have you ever been diagnosed with any of these? Select all that apply.    No   Not selected:    Urinary reflux    Bladder diverticula    Single (or horseshoe) kidney    Duplicated urethra   Have you recently held your urine for a long time after you felt the urge to go? Select one.    No   Not selected:    Yes   Have you recently avoided eating or drinking so you wouldn't have the urge to urinate as often? Select one.    No   Not selected:    Yes   Do you use a diaphragm? Select one.    No   Not selected:    Yes   Are you pregnant? Select one.    No   Not selected:    Yes   When was your last menstrual period? If you don't currently have periods or no longer have periods, please briefly explain.    Labor day weekend   Are you breastfeeding? Select one.    No   Not selected:    Yes   Have you had sexual intercourse in the past week? Recent sexual intercourse is a risk factor for urinary tract infections. Select one.    Yes   Not selected:    No   Have you had unprotected sexual intercourse with a new partner in the last 2 weeks? Select one.    No   Not selected:    Yes   Have you traveled to any of these countries within the last 3 months? Recent travel to these countries may affect which medication we recommend for your symptoms. Select all that apply.    None of these   Not selected:    [place]    [place]    [place]    [place]   Ibuprofen (Advil, Motrin)    Helpful   Not selected:    Not helpful   Have you ever had a urinary tract infection (UTI)? A UTI is often called a bladder infection or acute cystitis. Select one.    Yes   Not selected:    No, not that I know of   How much do your current symptoms feel like past UTIs? Select one.    Exactly the same   Not selected:    Mostly the same    Somewhat the  same    Totally different   In the past year, how many times have you taken antibiotics for a UTI? Select one.    0   Not selected:    1 to 3    4 or more   Have you ever developed a yeast infection as a result of taking antibiotics? Select one.    No, not that I know of   Not selected:    Yes   UTIs may be more serious when other factors are present. Let's address those now. Are you being treated for type 1 or type 2 diabetes? Select one.    No   Not selected:    Yes   Do you have any of these conditions that can affect the immune system? Scroll to see all options. Select all that apply.    None of these   Not selected:    History of bone marrow transplant    Chronic kidney disease    Chronic liver disease (including cirrhosis)    HIV/AIDS    Inflammatory bowel disease (Crohn's disease or ulcerative colitis)    Lupus    Moderate to severe plaque psoriasis    Multiple sclerosis    Rheumatoid arthritis    Sickle cell anemia    Alpha or beta thalassemia    History of solid organ transplant (kidney, liver, or heart)    History of spleen removal    An autoimmune disorder not listed here (specify)    A condition requiring treatment with long-term use of oral steroids (such as prednisone, prednisolone, or dexamethasone) (specify)   Have you ever been diagnosed with cancer? Select one.    No   Not selected:    Yes, I have cancer now    Yes, but I'm in remission   These last few questions will help us create the right treatment plan for you. Are you being treated for any of these conditions? Select all that apply.    High blood pressure   Not selected:    Homer-Danlos syndrome    Folate deficiency    G6PD deficiency    History of aortic aneurysm or dissection    Marfan syndrome    Megaloblastic anemia    Mono (mononucleosis)    Myasthenia gravis    Oliguria or anuria    Peripheral vascular disease    No   Have you ever had jaundice as a result of taking amoxicillin-clavulanate (Augmentin) or nitrofurantoin (Macrobid)?  Select all that apply.    No   Not selected:    Yes, from amoxicillin-clavulanate (Augmentin)    Yes, from nitrofurantoin (Macrobid, Macrodantin)   Are you taking any of these medications? Select all that apply.    No   Not selected:    An ACE inhibitor such as lisinopril, enalapril, captopril, or benazepril    An angiotensin II receptor blocker (ARB) such as candesartan, irbesartan, losartan, or valsartan   Are you still taking these medications listed in your medical record? If you're not taking any of these, click Next. Select all that apply.    losartan 25 MG tablet    Biotin 5000 MCG capsule   Are you taking any other medications, vitamins, or supplements? Select one.    No   Not selected:    Yes   Have you ever had an allergic or bad reaction to any medication? Select one.    No   Not selected:    Yes   Do you need a doctor's note? A doctor's note confirms that you received care today and states when you can return to school or work. It does not contain information about your diagnosis or treatment plan. Your provider will make the final decision on whether to give you a doctor's note. Doctor's notes CANNOT be backdated. Select one.    No   Not selected:    Today only (1 day)    Today and tomorrow (2 days)    3 days   Is there anything you'd like to add about your symptoms? Please limit your comments to the symptoms asked about in this interview. If you include comments about other concerns, your provider may recommend that you be seen in person.    No   ----------   Medical history   Medical history data does not currently exist for this patient.

## 2023-10-14 DIAGNOSIS — I10 PRIMARY HYPERTENSION: ICD-10-CM

## 2023-10-16 RX ORDER — LOSARTAN POTASSIUM 25 MG/1
25 TABLET ORAL 2 TIMES DAILY
Qty: 180 TABLET | Refills: 1 | Status: SHIPPED | OUTPATIENT
Start: 2023-10-16

## 2023-10-27 ENCOUNTER — LAB (OUTPATIENT)
Dept: LAB | Facility: HOSPITAL | Age: 38
End: 2023-10-27
Payer: COMMERCIAL

## 2023-10-27 ENCOUNTER — OFFICE VISIT (OUTPATIENT)
Dept: FAMILY MEDICINE CLINIC | Age: 38
End: 2023-10-27
Payer: COMMERCIAL

## 2023-10-27 VITALS
TEMPERATURE: 98.5 F | DIASTOLIC BLOOD PRESSURE: 89 MMHG | OXYGEN SATURATION: 98 % | SYSTOLIC BLOOD PRESSURE: 133 MMHG | BODY MASS INDEX: 36.29 KG/M2 | WEIGHT: 197.2 LBS | HEIGHT: 62 IN | HEART RATE: 73 BPM

## 2023-10-27 DIAGNOSIS — R00.0 TACHYCARDIA: ICD-10-CM

## 2023-10-27 DIAGNOSIS — Z00.00 ANNUAL PHYSICAL EXAM: Primary | ICD-10-CM

## 2023-10-27 DIAGNOSIS — Z12.31 ENCOUNTER FOR SCREENING MAMMOGRAM FOR MALIGNANT NEOPLASM OF BREAST: ICD-10-CM

## 2023-10-27 DIAGNOSIS — I10 PRIMARY HYPERTENSION: ICD-10-CM

## 2023-10-27 DIAGNOSIS — Z23 ENCOUNTER FOR IMMUNIZATION: ICD-10-CM

## 2023-10-27 LAB
T-UPTAKE NFR SERPL: 1.18 TBI (ref 0.8–1.3)
T4 SERPL-MCNC: 8.1 MCG/DL (ref 4.5–11.7)
TSH SERPL DL<=0.05 MIU/L-ACNC: 4.9 UIU/ML (ref 0.27–4.2)

## 2023-10-27 PROCEDURE — 84436 ASSAY OF TOTAL THYROXINE: CPT

## 2023-10-27 PROCEDURE — 36415 COLL VENOUS BLD VENIPUNCTURE: CPT

## 2023-10-27 PROCEDURE — 84443 ASSAY THYROID STIM HORMONE: CPT

## 2023-10-27 PROCEDURE — 84479 ASSAY OF THYROID (T3 OR T4): CPT

## 2023-10-27 RX ORDER — CARVEDILOL 3.12 MG/1
3.12 TABLET ORAL 2 TIMES DAILY WITH MEALS
Qty: 180 TABLET | Refills: 1 | Status: SHIPPED | OUTPATIENT
Start: 2023-10-27

## 2023-10-27 NOTE — PROGRESS NOTES
Chief Complaint  Isabel Lynn presents to [facility] for Annual Exam    Subjective          History of Present Illness    Aylin is here today for annual preventive exam.   Has received covid vaccine X 2.  UTD influenza vaccine.  Wishes to update Tdap vaccine.   Pap smear UTD with GYN.   Never smoker.     Aylin is here today to follow up on hypertension. Current medication includes Losartan 25 mg daily. Unable to tolerate atenolol secondary to bradycardia. She reports that she had episodes of tachycardia with heart rate in the 160s. 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 She had Holter monitor that she wore for 6 days. Results are pending. Advised to have thyroid checked. BP has been elevated some days.      Review of Systems   Constitutional:  Negative for chills and fever.   HENT:  Negative for ear pain and sore throat.    Eyes:  Negative for blurred vision and redness.   Respiratory:  Negative for shortness of breath and wheezing.    Cardiovascular:  Positive for palpitations. Negative for chest pain.   Gastrointestinal:  Negative for abdominal pain and vomiting.   Genitourinary:  Negative for dysuria, frequency and urgency.   Skin:  Negative for rash.   Neurological:  Negative for seizures and syncope.   Psychiatric/Behavioral:  Negative for suicidal ideas and depressed mood.          Allergies   Allergen Reactions    Penicillins Hives      Past Medical History:   Diagnosis Date    History of medical problems     Degenerative Disk Disease    Hyperlipidemia     Hypertension      Current Outpatient Medications   Medication Sig Dispense Refill    Biotin 5000 MCG capsule Take 5,000 mcg by mouth 4 (Four) Times a Day.      carvedilol (Coreg) 3.125 MG tablet Take 1 tablet by mouth 2 (Two) Times a Day With Meals. 180 tablet 1     No current facility-administered medications for this visit.     Past Surgical History:   Procedure Laterality Date     SECTION   "2008, 2011, 2014    CHOLECYSTECTOMY  2005    ENDOMETRIAL ABLATION  2015    TUBAL ABDOMINAL LIGATION  2015      Social History     Tobacco Use    Smoking status: Never     Passive exposure: Never    Smokeless tobacco: Never   Substance Use Topics    Alcohol use: Not Currently     Comment: None    Drug use: Never     Family History   Problem Relation Age of Onset    Hyperlipidemia Mother      Health Maintenance Due   Topic Date Due    BMI FOLLOWUP  10/14/2023      Immunization History   Administered Date(s) Administered    COVID-19 (PFIZER) Purple Cap Monovalent 08/03/2021, 08/24/2021    Fluzone (or Fluarix & Flulaval for VFC) >6mos 10/27/2022, 10/19/2023    Tdap 10/27/2023        Objective     Vitals:    10/27/23 1406 10/27/23 1410   BP: 143/91 133/89   BP Location: Right arm Left arm   Patient Position: Sitting Sitting   Cuff Size: Large Adult Large Adult   Pulse: 65 73   Temp: 98.5 °F (36.9 °C)    TempSrc: Oral    SpO2: 98%    Weight: 89.4 kg (197 lb 3.2 oz)    Height: 157.5 cm (62.01\")      Body mass index is 36.06 kg/m².     Class 2 Severe Obesity (BMI >=35 and <=39.9). Obesity-related health conditions include the following: hypertension. Obesity is  improving . BMI is is above average; BMI management plan is completed. We discussed low calorie, low carb based diet program.       Physical Exam  Vitals reviewed.   Constitutional:       General: She is not in acute distress.     Appearance: Normal appearance. She is well-developed.   HENT:      Head: Normocephalic and atraumatic.      Right Ear: Hearing, tympanic membrane and ear canal normal.      Left Ear: Hearing, tympanic membrane and ear canal normal.      Mouth/Throat:      Mouth: Mucous membranes are moist.   Eyes:      Extraocular Movements: Extraocular movements intact.      Pupils: Pupils are equal, round, and reactive to light.   Cardiovascular:      Rate and Rhythm: Normal rate and regular rhythm.      Pulses: Normal pulses.      Heart sounds: Normal " heart sounds.   Pulmonary:      Effort: Pulmonary effort is normal.      Breath sounds: Normal breath sounds.   Abdominal:      General: Bowel sounds are normal.      Palpations: Abdomen is soft.      Tenderness: There is no abdominal tenderness.   Musculoskeletal:         General: Normal range of motion.      Cervical back: Normal range of motion and neck supple.   Skin:     General: Skin is warm and dry.   Neurological:      Mental Status: She is alert and oriented to person, place, and time.   Psychiatric:         Mood and Affect: Mood normal.           Result Review :                               Assessment and Plan      Diagnoses and all orders for this visit:    1. Annual physical exam (Primary)  Comments:  Appropriate screenings and vaccinations were reviewed with the pt and offered as indicated.  Pt counseled on healthy lifestyle including healthy diet, exercise.    2. Encounter for screening mammogram for malignant neoplasm of breast  -     Mammo Screening Digital Tomosynthesis Bilateral With CAD; Future    3. Encounter for immunization  -     Tdap Vaccine Greater Than or Equal To 8yo IM    4. Primary hypertension  Comments:  Stop Losartan. Start carvedilol 3.125 mg twice daily. BP log.  Orders:  -     carvedilol (Coreg) 3.125 MG tablet; Take 1 tablet by mouth 2 (Two) Times a Day With Meals.  Dispense: 180 tablet; Refill: 1    5. Tachycardia  Comments:  She will let me know about Holter results.  Orders:  -     Thyroid Panel With TSH; Future                Follow Up     Return in about 6 months (around 4/27/2024) for Recheck.

## 2023-10-30 DIAGNOSIS — E03.9 HYPOTHYROIDISM, UNSPECIFIED TYPE: Primary | ICD-10-CM

## 2023-10-30 RX ORDER — LEVOTHYROXINE SODIUM 0.03 MG/1
25 TABLET ORAL DAILY
Qty: 90 TABLET | Refills: 1 | Status: SHIPPED | OUTPATIENT
Start: 2023-10-30

## 2023-12-13 DIAGNOSIS — I10 PRIMARY HYPERTENSION: Primary | ICD-10-CM

## 2023-12-13 RX ORDER — LISINOPRIL 5 MG/1
5 TABLET ORAL DAILY
Qty: 90 TABLET | Refills: 0 | Status: SHIPPED | OUTPATIENT
Start: 2023-12-13

## 2023-12-21 ENCOUNTER — HOSPITAL ENCOUNTER (OUTPATIENT)
Dept: MAMMOGRAPHY | Facility: HOSPITAL | Age: 38
Discharge: HOME | End: 2023-12-21
Admitting: NURSE PRACTITIONER
Payer: COMMERCIAL

## 2023-12-21 DIAGNOSIS — Z12.31 ENCOUNTER FOR SCREENING MAMMOGRAM FOR MALIGNANT NEOPLASM OF BREAST: ICD-10-CM

## 2023-12-21 PROCEDURE — 77067 SCR MAMMO BI INCL CAD: CPT

## 2023-12-21 PROCEDURE — 77063 BREAST TOMOSYNTHESIS BI: CPT

## 2024-03-09 DIAGNOSIS — I10 PRIMARY HYPERTENSION: ICD-10-CM

## 2024-03-11 RX ORDER — LISINOPRIL 5 MG/1
5 TABLET ORAL DAILY
Qty: 90 TABLET | Refills: 0 | Status: SHIPPED | OUTPATIENT
Start: 2024-03-11

## 2024-04-13 DIAGNOSIS — I10 PRIMARY HYPERTENSION: ICD-10-CM

## 2024-04-14 DIAGNOSIS — I10 PRIMARY HYPERTENSION: ICD-10-CM

## 2024-04-15 RX ORDER — LOSARTAN POTASSIUM 25 MG/1
25 TABLET ORAL 2 TIMES DAILY
Qty: 180 TABLET | Refills: 0 | OUTPATIENT
Start: 2024-04-15

## 2024-04-15 RX ORDER — CARVEDILOL 3.12 MG/1
3.12 TABLET ORAL 2 TIMES DAILY WITH MEALS
Qty: 180 TABLET | Refills: 0 | Status: SHIPPED | OUTPATIENT
Start: 2024-04-15

## 2024-04-20 DIAGNOSIS — E03.9 HYPOTHYROIDISM, UNSPECIFIED TYPE: ICD-10-CM

## 2024-04-22 RX ORDER — LEVOTHYROXINE SODIUM 0.03 MG/1
25 TABLET ORAL DAILY
Qty: 90 TABLET | Refills: 0 | Status: SHIPPED | OUTPATIENT
Start: 2024-04-22

## 2024-05-03 ENCOUNTER — OFFICE VISIT (OUTPATIENT)
Dept: FAMILY MEDICINE CLINIC | Age: 39
End: 2024-05-03
Payer: COMMERCIAL

## 2024-05-03 VITALS
HEART RATE: 64 BPM | BODY MASS INDEX: 39.64 KG/M2 | HEIGHT: 62 IN | OXYGEN SATURATION: 99 % | SYSTOLIC BLOOD PRESSURE: 118 MMHG | DIASTOLIC BLOOD PRESSURE: 83 MMHG | WEIGHT: 215.4 LBS | TEMPERATURE: 98.1 F

## 2024-05-03 DIAGNOSIS — I10 PRIMARY HYPERTENSION: ICD-10-CM

## 2024-05-03 DIAGNOSIS — E03.9 HYPOTHYROIDISM, UNSPECIFIED TYPE: ICD-10-CM

## 2024-05-03 PROCEDURE — 99214 OFFICE O/P EST MOD 30 MIN: CPT | Performed by: NURSE PRACTITIONER

## 2024-05-03 RX ORDER — CARVEDILOL 3.12 MG/1
3.12 TABLET ORAL 2 TIMES DAILY WITH MEALS
Qty: 180 TABLET | Refills: 1 | Status: SHIPPED | OUTPATIENT
Start: 2024-05-03

## 2024-05-03 RX ORDER — LISINOPRIL 5 MG/1
5 TABLET ORAL DAILY
Qty: 90 TABLET | Refills: 1 | Status: SHIPPED | OUTPATIENT
Start: 2024-05-03

## 2024-05-03 RX ORDER — LEVOTHYROXINE SODIUM 0.03 MG/1
25 TABLET ORAL DAILY
Qty: 90 TABLET | Refills: 1 | Status: SHIPPED | OUTPATIENT
Start: 2024-05-03

## 2024-05-03 NOTE — PROGRESS NOTES
Chief Complaint  Isabel Lynn presents to [facility] for Hypertension (6 month follow up )    Subjective          History of Present Illness    Aylin is following up on HTN today. Current medication is Lisinopril 5 mg daily and carvedilol 3.125 mg BID. She reports BP checks at home well controlled. Feeling well.   She is also on levothyroxine 25 mcg daily. Hypothyroidism was diagnosed after labs 10/27/23.    Review of Systems      Allergies   Allergen Reactions    Penicillins Hives      Past Medical History:   Diagnosis Date    History of medical problems     Degenerative Disk Disease    Hyperlipidemia     Hypertension      Current Outpatient Medications   Medication Sig Dispense Refill    carvedilol (COREG) 3.125 MG tablet Take 1 tablet by mouth 2 (Two) Times a Day With Meals. 180 tablet 1    levothyroxine (SYNTHROID, LEVOTHROID) 25 MCG tablet Take 1 tablet by mouth Daily. 90 tablet 1    lisinopril (PRINIVIL,ZESTRIL) 5 MG tablet Take 1 tablet by mouth Daily. 90 tablet 1     No current facility-administered medications for this visit.     Past Surgical History:   Procedure Laterality Date     SECTION  , ,     CHOLECYSTECTOMY  2005    ENDOMETRIAL ABLATION  2015    TUBAL ABDOMINAL LIGATION  2015      Social History     Tobacco Use    Smoking status: Never     Passive exposure: Never    Smokeless tobacco: Never   Vaping Use    Vaping status: Never Used   Substance Use Topics    Alcohol use: Not Currently     Comment: None    Drug use: Never     Family History   Problem Relation Age of Onset    Hyperlipidemia Mother      Health Maintenance Due   Topic Date Due    LIPID PANEL  2024    PAP SMEAR  2024      Immunization History   Administered Date(s) Administered    COVID-19 (PFIZER) Purple Cap Monovalent 2021, 2021    Fluzone (or Fluarix & Flulaval for VFC) >6mos 10/27/2022, 10/19/2023    Tdap 10/27/2023        Objective  "    Vitals:    05/03/24 1318   BP: 118/83   BP Location: Right arm   Patient Position: Sitting   Cuff Size: Large Adult   Pulse: 64   Temp: 98.1 °F (36.7 °C)   TempSrc: Oral   SpO2: 99%   Weight: 97.7 kg (215 lb 6.4 oz)   Height: 157.5 cm (62.01\")     Body mass index is 39.38 kg/m².                No results found.    Physical Exam  Vitals reviewed.   Constitutional:       General: She is not in acute distress.     Appearance: Normal appearance. She is well-developed.   HENT:      Head: Normocephalic and atraumatic.   Cardiovascular:      Rate and Rhythm: Normal rate and regular rhythm.   Pulmonary:      Effort: Pulmonary effort is normal.      Breath sounds: Normal breath sounds.   Neurological:      Mental Status: She is alert and oriented to person, place, and time.   Psychiatric:         Mood and Affect: Mood and affect normal.           Result Review :                               Assessment and Plan      Assessment & Plan  Primary hypertension    Medical condition is stable.  Continue same therapy.  Will recheck at next regular appointment    Hypothyroidism, unspecified type  Medical condition is stable.  Continue same therapy.  Will recheck at next regular appointment        Orders Placed This Encounter   Procedures    TSH    CBC (No Diff)    Lipid Panel    Comprehensive Metabolic Panel     New Medications Ordered This Visit   Medications    lisinopril (PRINIVIL,ZESTRIL) 5 MG tablet     Sig: Take 1 tablet by mouth Daily.     Dispense:  90 tablet     Refill:  1    carvedilol (COREG) 3.125 MG tablet     Sig: Take 1 tablet by mouth 2 (Two) Times a Day With Meals.     Dispense:  180 tablet     Refill:  1    levothyroxine (SYNTHROID, LEVOTHROID) 25 MCG tablet     Sig: Take 1 tablet by mouth Daily.     Dispense:  90 tablet     Refill:  1               Follow Up     Return in about 6 months (around 11/3/2024) for Annual physical.             "

## 2024-05-27 ENCOUNTER — E-VISIT (OUTPATIENT)
Dept: FAMILY MEDICINE CLINIC | Facility: TELEHEALTH | Age: 39
End: 2024-05-27

## 2024-05-27 PROCEDURE — BRIGHTMDVISIT: Performed by: NURSE PRACTITIONER

## 2024-05-27 NOTE — E-VISIT TREATED
Chief Complaint: Bladder infection (UTI)   Patient introduction   Patient is 38-year-old female. Patient provided the following organ inventory: Presence of vagina.   Patient has had dysuria, frequent urination, and urinary urgency for 1 to 3 days.   Urine is cloudy with a strong or pungent odor.   General presentation   No fever. No nausea or vomiting.   No stomach/pelvic pain.   No back pain.   No flank pain. Difficulty starting, stopping, or delaying urination.   Has not tried acetaminophen, ibuprofen, or phenazopyridine for current symptoms.   Has not taken antibiotics for current symptoms.   Previous history of UTI. Current symptoms feel exactly the same as previous UTIs. Received treatment for UTI 0 times in last year.   No known history of yeast infections as a result of taking antibiotics for past UTIs.   No history of pyelonephritis. No history of kidney stones.   Had sexual intercourse in the past week. Does not use diaphragm. No unprotected sexual intercourse with a new partner in the last 2 weeks.   Patient is not being treated for diabetes mellitus.   Review of red flags/alarm symptoms:    No recent hospitalizations or nursing home care (last 3 months)    No history of renal failure    No recent history of urologic instrumentation    No anatomic abnormalities of the urinary tract    No abnormal vaginal discharge    No visible vaginal sores    No pain with sexual intercourse    No abnormal vaginal bleeding or spotting   Pregnancy/menstrual status/breastfeeding:   Patient is not pregnant. Patient is not breastfeeding. Regarding date of last menstrual period, patient writes: May 5th.   Current medications   Currently taking lisinopril 5 MG tablet, carvedilol 3.125 MG tablet, levothyroxine 25 MCG tablet, and Coq10.   Medication allergies    Penicillins (reaction: raised, red, itchy spots with each spot lasting less than 24 hours)   Medication contraindication review   Not taking ACE inhibitors and  ARBs.   Patient is being treated for high blood pressure. Therefore, the following medication(s) will not be prescribed:    Ciprofloxacin   No known history of amoxicillin-clavulanate-associated cholestatic jaundice or nitrofurantoin-associated cholestatic jaundice.   Past medical history   Immune conditions: No immunocompromising conditions.   No history of cancer.   Patient did not request an excuse note.   Assessment   Uncomplicated acute UTI.   This is the likely diagnosis based on patient's interview responses, including:    Symptom profile    Previous history of UTI    Current symptoms are exactly the same as previous UTIs   No recent history of kidney stones.   Plan   Medications:    sulfamethoxazole 800 mg-trimethoprim 160 mg tablet RX 160mg/800mg 1 tab PO q12h 3d for infection. This medication is an antibiotic. Take it exactly as directed. You must finish the entire course of medication, even if you feel better after taking the first few doses. Amount is 6 tab.    phenazopyridine 200 mg tablet RX 200mg 1 tab PO tid PRN 2d for pain or discomfort associated with your condition. Amount is 6 tab.   The patient's prescriptions will be sent to:   [facility] [phone] [facility] [address]   Phone: [phone]     Fax: [phone]   Education:    Condition and causes    Prevention    Treatment and self-care    When to call provider   Follow-up:   Patient to follow up as needed for progression or lack of improvement in symptoms within 3d.   ----------   Electronically signed by PRADIP Leyva FNP-BC on 2024-05-27 at 05:44AM   ----------   Patient Interview Transcript:   Knowing about your anatomy is important for diagnosing and treating UTIs. The gender we have on file for you is female, but we realize this might not tell the whole story. Which of these do you have? Select one.    Vagina   Not selected:    Penis   Which of these symptoms do you have? Select all that apply.    Pain  or burning while urinating    Frequent urination    __Sudden urge to urinate and it's hard to hold the urine in __   How long have you had these symptoms? Select one.    1 to 3 days   Not selected:    Less than 24 hours    4 to 6 days    7 to 10 days    More than 10 days   Have you already started taking antibiotics for your current symptoms? Select one.    No   Not selected:    Yes   Since your current symptoms started, has it been difficult to start, stop, or delay urination? Select one.    Yes   Not selected:    No   What color is your urine? Select one.    Cloudy   Not selected:    Clear    Yellow    Pink or red   Does your urine smell strange (like ammonia) or stronger than usual? Select one.    Yes   Not selected:    No   Do you also have any of these symptoms? Select all that apply.    No   Not selected:    Fever    Nausea    Vomiting    Pain, pressure, or discomfort in the lower abdomen    Back pain   Do you have any flank pain? The flank is the side of the body between the ribs and the hips.    No   Not selected:    Yes, in my left flank    Yes, in my right flank    Yes, in both my left and right flanks   Do you have any of these vaginal symptoms? Select all that apply.    No   Not selected:    Abnormal vaginal itching    Unscheduled or abnormal vaginal bleeding or spotting    Pain during sex    Visible sores on the vagina    Abnormal vaginal discharge   In the past 2 weeks, have you had a medical device or instrument placed in your urinary tract? Examples include catheters, stents, and nephrostomy tubes. Select one.    No   Not selected:    Yes   Have you recently been hospitalized or been a resident of a nursing home or other long-term care facility? This doesn't include emergency room (ER) visits. Select one.    No   Not selected:    Yes, within the last 2 weeks    Yes, within the last 3 months   Kidney failure    No   Not selected:    Within the last year    More than a year ago   Kidney infection  (pyelonephritis)    No   Not selected:    Within the last year    More than a year ago   Kidney stones    No   Not selected:    Within the last year    More than a year ago   Kidney transplant    No   Not selected:    Within the last year    More than a year ago   Have you ever been diagnosed with any of these? Select all that apply.    No   Not selected:    Urinary reflux    Bladder diverticula    Single (or horseshoe) kidney    Duplicated urethra   Have you recently held your urine for a long time after you felt the urge to go? Select one.    No   Not selected:    Yes   Have you recently avoided eating or drinking so you wouldn't have the urge to urinate as often? Select one.    No   Not selected:    Yes   Do you use a diaphragm? Select one.    No   Not selected:    Yes   Are you pregnant? Select one.    No   Not selected:    Yes   When was your last menstrual period? If you don't currently have periods or no longer have periods, please briefly explain.    May 5th   Are you breastfeeding? Select one.    No   Not selected:    Yes   Have you had sexual intercourse in the past week? Recent sexual intercourse is a risk factor for urinary tract infections. Select one.    Yes   Not selected:    No   Have you had unprotected sexual intercourse with a new partner in the last 2 weeks? Select one.    No   Not selected:    Yes   Have you traveled to any of these countries within the last 3 months? Recent travel to these countries may affect which medication we recommend for your symptoms. Select all that apply.    None of these   Not selected:    [place]    [place]    [place]    [place]   Have you ever had a urinary tract infection (UTI)? A UTI is often called a bladder infection or acute cystitis. Select one.    Yes   Not selected:    No, not that I know of   How much do your current symptoms feel like past UTIs? Select one.    Exactly the same   Not selected:    Mostly the same    Somewhat the same    Totally different   In the  past year, how many times have you taken antibiotics for a UTI? Select one.    0   Not selected:    1 to 3    4 or more   Have you ever developed a yeast infection as a result of taking antibiotics? Select one.    No, not that I know of   Not selected:    Yes   UTIs may be more serious when other factors are present. Let's address those now. Are you being treated for type 1 or type 2 diabetes? Select one.    No   Not selected:    Yes   Do you have any of these conditions that can affect the immune system? Scroll to see all options. Select all that apply.    None of these   Not selected:    History of bone marrow transplant    Chronic kidney disease    Chronic liver disease (including cirrhosis)    HIV/AIDS    Inflammatory bowel disease (Crohn's disease or ulcerative colitis)    Lupus    Moderate to severe plaque psoriasis    Multiple sclerosis    Rheumatoid arthritis    Sickle cell anemia    Alpha or beta thalassemia    History of kidney, liver, heart, or other solid organ transplant    History of liver, heart, or other solid organ transplant    History of spleen removal    An autoimmune disorder not listed here (specify)    A condition requiring treatment with long-term use of oral steroids (such as prednisone, prednisolone, or dexamethasone) (specify)   Have you ever been diagnosed with cancer? Select one.    No   Not selected:    Yes, I have cancer now    Yes, but I'm in remission   These last few questions will help us create the right treatment plan for you. Are you being treated for any of these conditions? Select all that apply.    High blood pressure   Not selected:    Homer-Danlos syndrome    Folate deficiency    G6PD deficiency    History of aortic aneurysm or dissection    Marfan syndrome    Megaloblastic anemia    Mono (mononucleosis)    Myasthenia gravis    Oliguria or anuria    Peripheral vascular disease    No   Have you ever had jaundice as a result of taking amoxicillin-clavulanate (Augmentin) or  "nitrofurantoin (Macrobid)? Select all that apply.    No   Not selected:    Yes, from amoxicillin-clavulanate (Augmentin)    Yes, from nitrofurantoin (Macrobid, Macrodantin)   Are you taking any of these medications? Select all that apply.    No   Not selected:    An ACE inhibitor such as lisinopril, enalapril, captopril, or benazepril    An angiotensin II receptor blocker (ARB) such as candesartan, irbesartan, losartan, or valsartan   Are you still taking these medications listed in your medical record? If you're not taking any of these, click Next. Select all that apply.    lisinopril 5 MG tablet    carvedilol 3.125 MG tablet    levothyroxine 25 MCG tablet   Are you taking any other medications, vitamins, or supplements? Select one.    Yes   Not selected:    No   Have you ever had an allergic or bad reaction to any medication? Select one.    Yes   Not selected:    No   Have you had an allergic or bad reaction to any of these medications? Select all that apply.    Any antibiotic ending with \"-cillin,\" such as penicillin, amoxicillin, ampicillin, dicloxacillin, nafcillin, or piperacillin (brands include Augmentin, Unasyn, and Zosyn)   Not selected:    Any antibiotic starting with \"cef-,\" such as cefazolin, cefdinir, cefuroxime, ceftriaxone, ceftazidime, cefepime, or cephalexin (brands include Fortaz and Keflex)    Any antibiotic ending with \"-floxacin,\" such as ciprofloxacin, gemifloxacin, levofloxacin, moxifloxacin, or ofloxacin (brands include Cipro, Factive, Floxin, and Levaquin)    Nitrofurantoin (brands include Furadantin, Macrobid and Macrodantin)    Sulfamethoxazole-trimethoprim (brands include Bactrim and Septra) or any other sulfa drug    Fluconazole (brand name Diflucan), itraconazole, or terconazole    Trimethoprim (brand name Primsol)    No, not that I know of   When you had an allergic or bad reaction to penicillin or another \"-cillin\" antibiotic, did you have any of these symptoms? Select all that " apply.    Raised, red, itchy spots with each spot lasting less than 24 hours   Not selected:    Swelling of the mouth, eyes, lips, or tongue    Blisters or ulcers on the lips, mouth, eyes, or vagina    _Peeling skin _    Breathing difficulties    Feeling light-headed or faint    A fast heartbeat    Clammy skin    Confusion and anxiety    Collapsing or losing consciousness    Joint pains    Problems with kidneys, lungs, or liver    None of the above   Have you had an allergic or bad reaction to any of these medications? Select all that apply.    No   Not selected:    Acetaminophen (Tylenol)    Ibuprofen (Advil, Midol, Motrin)    Phenazopyridine (Azo, Baridium, Pyridium, Uricalm, Uristat)   Do you need a doctor's note? A doctor's note confirms that you received care today and states when you can return to school or work. It does not contain information about your diagnosis or treatment plan. Your provider will make the final decision on whether to give you a doctor's note. Doctor's notes CANNOT be backdated. Select one.    No   Not selected:    Today only (1 day)    Today and tomorrow (2 days)    3 days   Is there anything you'd like to add about your symptoms? Please limit your comments to the symptoms covered in this interview. If you include comments about other concerns, your provider may recommend that you be seen in person.   The patient did not enter any additional information.   ----------   Medical history   Medical history data does not currently exist for this patient.

## 2024-05-27 NOTE — EXTERNAL PATIENT INSTRUCTIONS
Note   I have prescribed Bactrim and Pyridium. IF your symptoms do not improve or become worse, follow up with your doctor.   Diagnosis   Urinary tract infection (UTI)   My name is PRADIP Leyva FNP-BC. I'm a healthcare provider at [facility]. After reviewing your interview, I see you have a urinary tract infection (UTI).   Medications   Your pharmacy   [facility] [phone] [facility] [address] [phone]     Prescription   TMP/SMX (160mg/800mg): Take 1 tablet by mouth every 12 hours for 3 days for infection. This medication is an antibiotic. Take it exactly as directed. You must finish the entire course of medication, even if you feel better after taking the first few doses.   Phenazopyridine (200mg): Take 1 tablet by mouth 3 times a day as needed for 2 days for pain or discomfort associated with your condition.    I've given you a prescription dose of phenazopyridine. If it's more affordable or convenient, you may use the equivalent amount of non-prescription phenazopyridine. For example, instead of taking one 200 mg phenazopyridine tablet, you may take two 95 mg phenazopyridine tablets.   About your diagnosis   A UTI is an infection of one or more parts of the urinary tract, most commonly the bladder.   Most UTIs are caused by bacteria (usually E. coli) that travel up the urethra and into the bladder. I see that you have some common signs and symptoms of a UTI:    Pain or burning while urinating    Frequent urination    Sudden urge to urinate    Symptoms that feel a lot like past UTIs    Cloudy urine    Strange or strong smelling urine    Symptoms that began shortly after sexual intercourse   Fortunately, most UTIs aren't serious, and they're easily treated with antibiotics. Make sure you take all of the antibiotic pills given to you, even if you start to feel better after the first few doses. Otherwise, the UTI might come back.   What to expect   If you follow this  treatment plan, you should start to feel better within 1 to 2 days.   When to seek care   Call us at [phone]   with any sudden or unexpected symptoms.    Symptoms that don't improve or get worse in the next 48 hours    Fever that goes above 101F or lasts longer than 24 hours    Shaking or chills    Nausea or vomiting    Severe flank pain (pain in your back or side)   Other treatment    Rest and drink plenty of water    Urinate frequently and when you first feel the urge    Place a heating pad on your back or stomach to help relieve some of the discomfort   Prevention    Drink a lot of liquids to help flush bacteria from your system. Water is best. Try for six to eight, 8-ounce glasses a day on a regular basis.    Urinate often and when you first feel the urge. Bacteria can grow when urine stays in the bladder too long. Urinate after sex to flush away bacteria.    After using the toilet, always wipe from front to back. This step is most important after a bowel movement. Wiping from front to back prevents bacteria normally found in stool from entering the urinary tract.   Your provider   Your diagnosis was provided by PRADIP Leyva, FNP-BC, a member of your trusted care team at [facility].   If you have any questions, call us at [phone]  .

## 2024-06-07 ENCOUNTER — TELEPHONE (OUTPATIENT)
Dept: FAMILY MEDICINE CLINIC | Age: 39
End: 2024-06-07
Payer: COMMERCIAL

## 2024-06-14 ENCOUNTER — LAB (OUTPATIENT)
Dept: LAB | Facility: HOSPITAL | Age: 39
End: 2024-06-14
Payer: COMMERCIAL

## 2024-06-14 DIAGNOSIS — E03.9 HYPOTHYROIDISM, UNSPECIFIED TYPE: ICD-10-CM

## 2024-06-14 DIAGNOSIS — I10 PRIMARY HYPERTENSION: ICD-10-CM

## 2024-06-14 LAB
ALBUMIN SERPL-MCNC: 4.5 G/DL (ref 3.5–5.2)
ALBUMIN/GLOB SERPL: 1.7 G/DL
ALP SERPL-CCNC: 85 U/L (ref 39–117)
ALT SERPL W P-5'-P-CCNC: 22 U/L (ref 1–33)
ANION GAP SERPL CALCULATED.3IONS-SCNC: 12 MMOL/L (ref 5–15)
AST SERPL-CCNC: 17 U/L (ref 1–32)
BILIRUB SERPL-MCNC: 0.5 MG/DL (ref 0–1.2)
BUN SERPL-MCNC: 8 MG/DL (ref 6–20)
BUN/CREAT SERPL: 9.6 (ref 7–25)
CALCIUM SPEC-SCNC: 9.3 MG/DL (ref 8.6–10.5)
CHLORIDE SERPL-SCNC: 102 MMOL/L (ref 98–107)
CHOLEST SERPL-MCNC: 212 MG/DL (ref 0–200)
CO2 SERPL-SCNC: 25 MMOL/L (ref 22–29)
CREAT SERPL-MCNC: 0.83 MG/DL (ref 0.57–1)
DEPRECATED RDW RBC AUTO: 39.5 FL (ref 37–54)
EGFRCR SERPLBLD CKD-EPI 2021: 91.5 ML/MIN/1.73
ERYTHROCYTE [DISTWIDTH] IN BLOOD BY AUTOMATED COUNT: 12 % (ref 12.3–15.4)
GLOBULIN UR ELPH-MCNC: 2.6 GM/DL
GLUCOSE SERPL-MCNC: 84 MG/DL (ref 65–99)
HCT VFR BLD AUTO: 41.7 % (ref 34–46.6)
HDLC SERPL-MCNC: 63 MG/DL (ref 40–60)
HGB BLD-MCNC: 13.5 G/DL (ref 12–15.9)
LDLC SERPL CALC-MCNC: 135 MG/DL (ref 0–100)
LDLC/HDLC SERPL: 2.12 {RATIO}
MCH RBC QN AUTO: 28.7 PG (ref 26.6–33)
MCHC RBC AUTO-ENTMCNC: 32.4 G/DL (ref 31.5–35.7)
MCV RBC AUTO: 88.5 FL (ref 79–97)
PLATELET # BLD AUTO: 288 10*3/MM3 (ref 140–450)
PMV BLD AUTO: 10.1 FL (ref 6–12)
POTASSIUM SERPL-SCNC: 4.2 MMOL/L (ref 3.5–5.2)
PROT SERPL-MCNC: 7.1 G/DL (ref 6–8.5)
RBC # BLD AUTO: 4.71 10*6/MM3 (ref 3.77–5.28)
SODIUM SERPL-SCNC: 139 MMOL/L (ref 136–145)
TRIGL SERPL-MCNC: 76 MG/DL (ref 0–150)
TSH SERPL DL<=0.05 MIU/L-ACNC: 3.26 UIU/ML (ref 0.27–4.2)
VLDLC SERPL-MCNC: 14 MG/DL (ref 5–40)
WBC NRBC COR # BLD AUTO: 7.94 10*3/MM3 (ref 3.4–10.8)

## 2024-06-14 PROCEDURE — 80061 LIPID PANEL: CPT

## 2024-06-14 PROCEDURE — 80050 GENERAL HEALTH PANEL: CPT

## 2024-06-14 PROCEDURE — 36415 COLL VENOUS BLD VENIPUNCTURE: CPT

## 2024-11-17 DIAGNOSIS — I10 PRIMARY HYPERTENSION: ICD-10-CM

## 2024-11-19 RX ORDER — LISINOPRIL 5 MG/1
5 TABLET ORAL DAILY
Qty: 90 TABLET | Refills: 0 | Status: SHIPPED | OUTPATIENT
Start: 2024-11-19

## 2024-11-27 ENCOUNTER — LAB (OUTPATIENT)
Dept: LAB | Facility: HOSPITAL | Age: 39
End: 2024-11-27
Payer: COMMERCIAL

## 2024-11-27 ENCOUNTER — OFFICE VISIT (OUTPATIENT)
Dept: FAMILY MEDICINE CLINIC | Age: 39
End: 2024-11-27
Payer: COMMERCIAL

## 2024-11-27 VITALS
WEIGHT: 191 LBS | HEIGHT: 62 IN | TEMPERATURE: 98.3 F | DIASTOLIC BLOOD PRESSURE: 89 MMHG | HEART RATE: 65 BPM | SYSTOLIC BLOOD PRESSURE: 138 MMHG | BODY MASS INDEX: 35.15 KG/M2 | OXYGEN SATURATION: 98 %

## 2024-11-27 DIAGNOSIS — E03.9 HYPOTHYROIDISM, UNSPECIFIED TYPE: ICD-10-CM

## 2024-11-27 DIAGNOSIS — I10 PRIMARY HYPERTENSION: Primary | ICD-10-CM

## 2024-11-27 DIAGNOSIS — Z12.31 ENCOUNTER FOR SCREENING MAMMOGRAM FOR MALIGNANT NEOPLASM OF BREAST: ICD-10-CM

## 2024-11-27 DIAGNOSIS — I10 PRIMARY HYPERTENSION: ICD-10-CM

## 2024-11-27 LAB
ALBUMIN SERPL-MCNC: 4.4 G/DL (ref 3.5–5.2)
ALBUMIN/GLOB SERPL: 1.9 G/DL
ALP SERPL-CCNC: 85 U/L (ref 39–117)
ALT SERPL W P-5'-P-CCNC: 17 U/L (ref 1–33)
ANION GAP SERPL CALCULATED.3IONS-SCNC: 11.2 MMOL/L (ref 5–15)
AST SERPL-CCNC: 17 U/L (ref 1–32)
BILIRUB SERPL-MCNC: 0.3 MG/DL (ref 0–1.2)
BUN SERPL-MCNC: 10 MG/DL (ref 6–20)
BUN/CREAT SERPL: 12.5 (ref 7–25)
CALCIUM SPEC-SCNC: 9.1 MG/DL (ref 8.6–10.5)
CHLORIDE SERPL-SCNC: 102 MMOL/L (ref 98–107)
CO2 SERPL-SCNC: 24.8 MMOL/L (ref 22–29)
CREAT SERPL-MCNC: 0.8 MG/DL (ref 0.57–1)
EGFRCR SERPLBLD CKD-EPI 2021: 95.1 ML/MIN/1.73
GLOBULIN UR ELPH-MCNC: 2.3 GM/DL
GLUCOSE SERPL-MCNC: 93 MG/DL (ref 65–99)
POTASSIUM SERPL-SCNC: 4.6 MMOL/L (ref 3.5–5.2)
PROT SERPL-MCNC: 6.7 G/DL (ref 6–8.5)
SODIUM SERPL-SCNC: 138 MMOL/L (ref 136–145)
TSH SERPL DL<=0.05 MIU/L-ACNC: 2.39 UIU/ML (ref 0.27–4.2)

## 2024-11-27 PROCEDURE — 36415 COLL VENOUS BLD VENIPUNCTURE: CPT

## 2024-11-27 PROCEDURE — 99214 OFFICE O/P EST MOD 30 MIN: CPT | Performed by: NURSE PRACTITIONER

## 2024-11-27 PROCEDURE — 84443 ASSAY THYROID STIM HORMONE: CPT

## 2024-11-27 PROCEDURE — 80053 COMPREHEN METABOLIC PANEL: CPT

## 2024-11-27 RX ORDER — CARVEDILOL 3.12 MG/1
3.12 TABLET ORAL 2 TIMES DAILY WITH MEALS
Qty: 180 TABLET | Refills: 1 | Status: SHIPPED | OUTPATIENT
Start: 2024-11-27

## 2024-11-27 RX ORDER — LISINOPRIL 5 MG/1
5 TABLET ORAL DAILY
Qty: 90 TABLET | Refills: 1 | Status: SHIPPED | OUTPATIENT
Start: 2024-11-27

## 2024-11-27 RX ORDER — LEVOTHYROXINE SODIUM 25 UG/1
25 TABLET ORAL DAILY
Qty: 90 TABLET | Refills: 1 | Status: SHIPPED | OUTPATIENT
Start: 2024-11-27

## 2024-11-27 NOTE — PROGRESS NOTES
Chief Complaint  Hypertension (6 month f/u) and Med Refill (Med refills)    Subjective        Isabel Lynn presents to [facility] today for follow-up on blood pressure and thyroid.  For blood pressure she is taking Coreg 3.1252 times daily with lisinopril 5 mg daily with adequate trial of blood pressure.  Last CMP 6/14/2024 was stable.    Regarding hypothyroidism, taking Synthroid 25 mcg daily with improvement in thyroid.  Last TSH done 6/14/2024 was 3.2.  Will repeat today    Will be due for repeat mammogram in December, order sent.       Answers submitted by the patient for this visit:  Primary Reason for Visit (Submitted on 11/26/2024)  What is the primary reason for your visit?: Problem Not Listed  Problem not listed (Submitted on 11/26/2024)  Chief Complaint: Other medical problem  Reason for appointment: Just a follow up. HTN and blood work  abdominal pain: No  anorexia: No  joint pain: No  change in stool: No  chest pain: No  chills: No  nasal congestion: No  cough: No  diaphoresis: No  fatigue: No  fever: No  headaches: No  joint swelling: No  myalgias: No  nausea: No  neck pain: No  numbness: No  rash: No  sore throat: No  swollen glands: No  dysuria: No  vertigo: No  visual change: No  vomiting: No  weakness: No  Onset: more than 5 years  Chronicity: chronic        Current Outpatient Medications:     carvedilol (COREG) 3.125 MG tablet, Take 1 tablet by mouth 2 (Two) Times a Day With Meals., Disp: 180 tablet, Rfl: 1    levothyroxine (SYNTHROID, LEVOTHROID) 25 MCG tablet, Take 1 tablet by mouth Daily., Disp: 90 tablet, Rfl: 1    lisinopril (PRINIVIL,ZESTRIL) 5 MG tablet, Take 1 tablet by mouth Daily., Disp: 90 tablet, Rfl: 1  Medications Discontinued During This Encounter   Medication Reason    carvedilol (COREG) 3.125 MG tablet Reorder    levothyroxine (SYNTHROID, LEVOTHROID) 25 MCG tablet Reorder    lisinopril (PRINIVIL,ZESTRIL) 5 MG tablet Reorder  "        Allergies:  Penicillins      Objective   Vital Signs:   Vitals:    11/27/24 0946   BP: 138/89   BP Location: Left arm   Patient Position: Sitting   Cuff Size: Adult   Pulse: 65   Temp: 98.3 °F (36.8 °C)   TempSrc: Oral   SpO2: 98%   Weight: 86.6 kg (191 lb)   Height: 157.5 cm (62.01\")     Body mass index is 34.93 kg/m².        Physical Exam  Constitutional:       Appearance: Normal appearance.   Neck:      Vascular: No carotid bruit.   Cardiovascular:      Rate and Rhythm: Normal rate and regular rhythm.      Heart sounds: Normal heart sounds.   Pulmonary:      Effort: Pulmonary effort is normal.      Breath sounds: Normal breath sounds.   Musculoskeletal:         General: Normal range of motion.   Skin:     General: Skin is warm and dry.   Neurological:      General: No focal deficit present.      Mental Status: She is alert.   Psychiatric:         Mood and Affect: Mood normal.         Behavior: Behavior normal.             Lab Results   Component Value Date    GLUCOSE 84 06/14/2024    BUN 8 06/14/2024    CREATININE 0.83 06/14/2024    BCR 9.6 06/14/2024    K 4.2 06/14/2024    CO2 25.0 06/14/2024    CALCIUM 9.3 06/14/2024    ALBUMIN 4.5 06/14/2024    AST 17 06/14/2024    ALT 22 06/14/2024       Lab Results   Component Value Date    CHOL 212 (H) 06/14/2024    TRIG 76 06/14/2024    HDL 63 (H) 06/14/2024     (H) 06/14/2024       Lab Results   Component Value Date    WBC 7.94 06/14/2024    HGB 13.5 06/14/2024    HCT 41.7 06/14/2024    MCV 88.5 06/14/2024     06/14/2024           Procedures         Diagnoses and all orders for this visit:    1. Primary hypertension (Primary)  -     Comprehensive Metabolic Panel; Future  -     carvedilol (COREG) 3.125 MG tablet; Take 1 tablet by mouth 2 (Two) Times a Day With Meals.  Dispense: 180 tablet; Refill: 1  -     lisinopril (PRINIVIL,ZESTRIL) 5 MG tablet; Take 1 tablet by mouth Daily.  Dispense: 90 tablet; Refill: 1    2. Hypothyroidism, unspecified type  -  "    TSH Rfx On Abnormal To Free T4; Future  -     levothyroxine (SYNTHROID, LEVOTHROID) 25 MCG tablet; Take 1 tablet by mouth Daily.  Dispense: 90 tablet; Refill: 1    3. Encounter for screening mammogram for malignant neoplasm of breast  -     Mammo Screening Digital Tomosynthesis Bilateral With CAD; Future            Follow Up  Return in about 6 months (around 5/27/2025) for Annual physical.  Patient was given instructions and counseling regarding her condition or for health maintenance advice. Please see specific information pulled into the AVS if appropriate.           Daren Handy, APRN  11/27/2024    Please note that portions of this document were completed using a voice recognition program.

## 2024-12-02 NOTE — PROGRESS NOTES
TSH is normal at 2.3, continue same dose levothyroxine, CMP is also normal please let her know.  Follow-up for annual exam 6 months with PCP

## 2025-04-09 ENCOUNTER — HOSPITAL ENCOUNTER (OUTPATIENT)
Dept: MAMMOGRAPHY | Facility: HOSPITAL | Age: 40
Discharge: HOME | End: 2025-04-09
Admitting: NURSE PRACTITIONER
Payer: COMMERCIAL

## 2025-04-09 ENCOUNTER — RESULTS FOLLOW-UP (OUTPATIENT)
Dept: FAMILY MEDICINE CLINIC | Age: 40
End: 2025-04-09
Payer: COMMERCIAL

## 2025-04-09 DIAGNOSIS — Z12.31 ENCOUNTER FOR SCREENING MAMMOGRAM FOR MALIGNANT NEOPLASM OF BREAST: ICD-10-CM

## 2025-04-09 PROCEDURE — 77063 BREAST TOMOSYNTHESIS BI: CPT

## 2025-04-09 PROCEDURE — 77067 SCR MAMMO BI INCL CAD: CPT

## 2025-04-09 NOTE — LETTER
Isabel Lynn  [address] [place] [id number]    April 9, 2025     Dear Ms. Lynn:    Your Mammogram is normal, you can repeat in 1 year.    Below are the results from your recent visit:    Resulted Orders   Mammo Screening Digital Tomosynthesis Bilateral With CAD    Narrative    MAMMO SCREENING DIGITAL TOMOSYNTHESIS BILATERAL W CAD-     Date of Exam: 4/9/2025 6:59 AM     Indication: Screening.     Comparison: 12/21/2023     Technique: Routine screening mammogram images were obtained per  protocol.  Tomosynthesis was utilized.  These mammographic images were  interpreted with the assistance of a computer aided detection system.     Breast Density: The breast(s) are heterogenously dense, which may  obscure small masses.     Findings:    No suspicious masses, suspicious microcalcifications, or architectural  distortions are identified.       Impression    No mammographic evidence of malignancy.  Recommend annual screening  mammography.     BI-RADS ASSESSMENT: BI-RADS 2. Benign findings.     Note:  It has been reported that there is approximately a 15% false  negative rate in mammography.  Therefore, management of a palpable  abnormality should not be deferred because of a negative mammogram.     4/9/2025 8:08 AM by DARRYL VANG MD on Workstation: [id number]              If you have any questions or concerns, please don't hesitate to call.         Sincerely,        PRADIP Galvin

## 2025-04-30 DIAGNOSIS — I10 PRIMARY HYPERTENSION: ICD-10-CM

## 2025-04-30 RX ORDER — CARVEDILOL 3.12 MG/1
3.12 TABLET ORAL 2 TIMES DAILY WITH MEALS
Qty: 180 TABLET | Refills: 0 | Status: SHIPPED | OUTPATIENT
Start: 2025-04-30

## 2025-05-30 ENCOUNTER — OFFICE VISIT (OUTPATIENT)
Dept: FAMILY MEDICINE CLINIC | Age: 40
End: 2025-05-30
Payer: COMMERCIAL

## 2025-05-30 ENCOUNTER — LAB (OUTPATIENT)
Dept: LAB | Facility: HOSPITAL | Age: 40
End: 2025-05-30
Payer: COMMERCIAL

## 2025-05-30 VITALS
OXYGEN SATURATION: 100 % | WEIGHT: 180 LBS | BODY MASS INDEX: 33.13 KG/M2 | SYSTOLIC BLOOD PRESSURE: 135 MMHG | DIASTOLIC BLOOD PRESSURE: 85 MMHG | HEART RATE: 55 BPM | TEMPERATURE: 98.3 F | HEIGHT: 62 IN

## 2025-05-30 DIAGNOSIS — I10 PRIMARY HYPERTENSION: ICD-10-CM

## 2025-05-30 DIAGNOSIS — Z00.00 ANNUAL PHYSICAL EXAM: Primary | ICD-10-CM

## 2025-05-30 DIAGNOSIS — Z00.00 ANNUAL PHYSICAL EXAM: ICD-10-CM

## 2025-05-30 DIAGNOSIS — R82.90 CLOUDY URINE: ICD-10-CM

## 2025-05-30 DIAGNOSIS — E03.9 HYPOTHYROIDISM, UNSPECIFIED TYPE: ICD-10-CM

## 2025-05-30 LAB
ALBUMIN SERPL-MCNC: 4.5 G/DL (ref 3.5–5.2)
ALBUMIN/GLOB SERPL: 1.6 G/DL
ALP SERPL-CCNC: 86 U/L (ref 39–117)
ALT SERPL W P-5'-P-CCNC: 15 U/L (ref 1–33)
ANION GAP SERPL CALCULATED.3IONS-SCNC: 11.1 MMOL/L (ref 5–15)
AST SERPL-CCNC: 17 U/L (ref 1–32)
BILIRUB SERPL-MCNC: 0.5 MG/DL (ref 0–1.2)
BUN SERPL-MCNC: 11 MG/DL (ref 6–20)
BUN/CREAT SERPL: 13.8 (ref 7–25)
CALCIUM SPEC-SCNC: 9.4 MG/DL (ref 8.6–10.5)
CHLORIDE SERPL-SCNC: 100 MMOL/L (ref 98–107)
CHOLEST SERPL-MCNC: 208 MG/DL (ref 0–200)
CO2 SERPL-SCNC: 25.9 MMOL/L (ref 22–29)
CREAT SERPL-MCNC: 0.8 MG/DL (ref 0.57–1)
DEPRECATED RDW RBC AUTO: 37.2 FL (ref 37–54)
EGFRCR SERPLBLD CKD-EPI 2021: 95.1 ML/MIN/1.73
ERYTHROCYTE [DISTWIDTH] IN BLOOD BY AUTOMATED COUNT: 11.7 % (ref 12.3–15.4)
GLOBULIN UR ELPH-MCNC: 2.8 GM/DL
GLUCOSE SERPL-MCNC: 82 MG/DL (ref 65–99)
HBA1C MFR BLD: 5.3 % (ref 4.8–5.6)
HCT VFR BLD AUTO: 40.6 % (ref 34–46.6)
HDLC SERPL-MCNC: 59 MG/DL (ref 40–60)
HGB BLD-MCNC: 13.4 G/DL (ref 12–15.9)
LDLC SERPL CALC-MCNC: 139 MG/DL (ref 0–100)
LDLC/HDLC SERPL: 2.34 {RATIO}
MCH RBC QN AUTO: 29.2 PG (ref 26.6–33)
MCHC RBC AUTO-ENTMCNC: 33 G/DL (ref 31.5–35.7)
MCV RBC AUTO: 88.5 FL (ref 79–97)
PLATELET # BLD AUTO: 293 10*3/MM3 (ref 140–450)
PMV BLD AUTO: 11.6 FL (ref 6–12)
POTASSIUM SERPL-SCNC: 4.3 MMOL/L (ref 3.5–5.2)
PROT SERPL-MCNC: 7.3 G/DL (ref 6–8.5)
RBC # BLD AUTO: 4.59 10*6/MM3 (ref 3.77–5.28)
SODIUM SERPL-SCNC: 137 MMOL/L (ref 136–145)
TRIGL SERPL-MCNC: 54 MG/DL (ref 0–150)
TSH SERPL DL<=0.05 MIU/L-ACNC: 3.28 UIU/ML (ref 0.27–4.2)
VLDLC SERPL-MCNC: 10 MG/DL (ref 5–40)
WBC NRBC COR # BLD AUTO: 7.39 10*3/MM3 (ref 3.4–10.8)

## 2025-05-30 PROCEDURE — 36415 COLL VENOUS BLD VENIPUNCTURE: CPT

## 2025-05-30 PROCEDURE — 83036 HEMOGLOBIN GLYCOSYLATED A1C: CPT

## 2025-05-30 PROCEDURE — 80050 GENERAL HEALTH PANEL: CPT

## 2025-05-30 PROCEDURE — 80061 LIPID PANEL: CPT

## 2025-05-30 RX ORDER — LISINOPRIL 5 MG/1
5 TABLET ORAL DAILY
Qty: 90 TABLET | Refills: 1 | Status: SHIPPED | OUTPATIENT
Start: 2025-05-30

## 2025-05-30 RX ORDER — LEVOTHYROXINE SODIUM 25 UG/1
25 TABLET ORAL DAILY
Qty: 90 TABLET | Refills: 1 | Status: SHIPPED | OUTPATIENT
Start: 2025-05-30

## 2025-05-30 RX ORDER — CARVEDILOL 3.12 MG/1
3.12 TABLET ORAL 2 TIMES DAILY WITH MEALS
Qty: 180 TABLET | Refills: 1 | Status: SHIPPED | OUTPATIENT
Start: 2025-05-30

## 2025-05-30 NOTE — ASSESSMENT & PLAN NOTE
Hypertension is stable and controlled  Continue current treatment regimen.  Blood pressure will be reassessed in 6 months.    Orders:    lisinopril (PRINIVIL,ZESTRIL) 5 MG tablet; Take 1 tablet by mouth Daily.    carvedilol (COREG) 3.125 MG tablet; Take 1 tablet by mouth 2 (Two) Times a Day With Meals.

## 2025-05-30 NOTE — ASSESSMENT & PLAN NOTE
Medical condition is stable.  Continue same therapy.  Will recheck at next regular appointment    Orders:    levothyroxine (SYNTHROID, LEVOTHROID) 25 MCG tablet; Take 1 tablet by mouth Daily.    TSH Rfx On Abnormal To Free T4; Future

## 2025-05-30 NOTE — PROGRESS NOTES
Chief Complaint  Isabel Lynn presents to [facility] for Annual Exam    Subjective          History of Present Illness    Aylin is here today for preventive exam.   Has received covid vaccine X 2.  UTD Tdap vaccine.   She is due for pap smear. Plans to schedule with GYN.   Mammogram 4/9/25 normal.   Never smoker.     She is also following up on HTN. Current medication is Lisinopril 5 mg daily and carvedilol 3.125 mg BID. She reports BP checks at home well controlled. Feeling well. She has been exercising with a walking pad.   She is also on levothyroxine 25 mcg daily. Hypothyroidism was diagnosed after labs 10/27/23. Last TSH normal.   She would like to have her A1C checked as her pee is cloudy when eating ice cream the night before.     Review of Systems   Constitutional:  Negative for chills and fever.   HENT:  Negative for ear pain and sore throat.    Eyes:  Negative for blurred vision and redness.   Respiratory:  Negative for shortness of breath and wheezing.    Cardiovascular:  Negative for chest pain and palpitations.   Gastrointestinal:  Negative for abdominal pain and vomiting.   Skin:  Negative for rash.   Neurological:  Negative for seizures and syncope.   Psychiatric/Behavioral:  Negative for suicidal ideas and depressed mood.          Allergies   Allergen Reactions    Penicillins Hives      Past Medical History:   Diagnosis Date    History of medical problems 2003    Degenerative Disk Disease    Hyperlipidemia 2021    Hypertension 2022    Hypothyroidism 2022     Current Outpatient Medications   Medication Sig Dispense Refill    carvedilol (COREG) 3.125 MG tablet Take 1 tablet by mouth 2 (Two) Times a Day With Meals. 180 tablet 1    levothyroxine (SYNTHROID, LEVOTHROID) 25 MCG tablet Take 1 tablet by mouth Daily. 90 tablet 1    lisinopril (PRINIVIL,ZESTRIL) 5 MG tablet Take 1 tablet by mouth Daily. 90 tablet 1     No current facility-administered medications for this  "visit.     Past Surgical History:   Procedure Laterality Date     SECTION  , , 2014    CHOLECYSTECTOMY  2005    ENDOMETRIAL ABLATION  2015    TUBAL ABDOMINAL LIGATION  2015      Social History     Tobacco Use    Smoking status: Never     Passive exposure: Never    Smokeless tobacco: Never   Vaping Use    Vaping status: Never Used   Substance Use Topics    Alcohol use: Not Currently     Comment: None    Drug use: Never     Family History   Problem Relation Age of Onset    Hyperlipidemia Mother      Health Maintenance Due   Topic Date Due    PAP SMEAR  2024    ANNUAL PHYSICAL  10/27/2024      Immunization History   Administered Date(s) Administered    COVID-19 (PFIZER) Purple Cap Monovalent 2021, 2021    Fluzone (or Fluarix & Flulaval for VFC) >6mos 10/27/2022, 10/19/2023    Tdap 10/27/2023        Objective     Vitals:    25 1536   BP: 135/85   Pulse: 55   Temp: 98.3 °F (36.8 °C)   TempSrc: Oral   SpO2: 100%   Weight: 81.6 kg (180 lb)   Height: 157.5 cm (62.01\")     Body mass index is 32.91 kg/m².                No results found.    Physical Exam  Vitals reviewed.   Constitutional:       General: She is not in acute distress.     Appearance: Normal appearance. She is well-developed.   HENT:      Head: Normocephalic and atraumatic.      Right Ear: Hearing, tympanic membrane and ear canal normal.      Left Ear: Hearing, tympanic membrane and ear canal normal.      Mouth/Throat:      Mouth: Mucous membranes are moist.   Eyes:      Extraocular Movements: Extraocular movements intact.      Pupils: Pupils are equal, round, and reactive to light.   Cardiovascular:      Rate and Rhythm: Normal rate and regular rhythm.      Pulses: Normal pulses.      Heart sounds: Normal heart sounds.   Pulmonary:      Effort: Pulmonary effort is normal.      Breath sounds: Normal breath sounds.   Abdominal:      General: Bowel sounds are normal.      Palpations: Abdomen is soft.      Tenderness: There " is no abdominal tenderness.   Musculoskeletal:         General: Normal range of motion.      Cervical back: Normal range of motion and neck supple.   Skin:     General: Skin is warm and dry.   Neurological:      Mental Status: She is alert and oriented to person, place, and time.   Psychiatric:         Mood and Affect: Mood normal.           Result Review :                               Assessment and Plan      Assessment & Plan  Annual physical exam  Appropriate screenings and vaccinations were reviewed with the pt and offered as indicated.  Pt counseled on healthy lifestyle including healthy diet, exercise.    Orders:    CBC (No Diff); Future    Lipid Panel; Future    Comprehensive Metabolic Panel; Future    Primary hypertension  Hypertension is stable and controlled  Continue current treatment regimen.  Blood pressure will be reassessed in 6 months.    Orders:    lisinopril (PRINIVIL,ZESTRIL) 5 MG tablet; Take 1 tablet by mouth Daily.    carvedilol (COREG) 3.125 MG tablet; Take 1 tablet by mouth 2 (Two) Times a Day With Meals.    Hypothyroidism, unspecified type  Medical condition is stable.  Continue same therapy.  Will recheck at next regular appointment    Orders:    levothyroxine (SYNTHROID, LEVOTHROID) 25 MCG tablet; Take 1 tablet by mouth Daily.    TSH Rfx On Abnormal To Free T4; Future    Cloudy urine  Checking A1c   Orders:    Hemoglobin A1c; Future            Follow Up     Return in about 6 months (around 11/30/2025) for Recheck.

## 2025-06-03 ENCOUNTER — RESULTS FOLLOW-UP (OUTPATIENT)
Dept: FAMILY MEDICINE CLINIC | Age: 40
End: 2025-06-03
Payer: COMMERCIAL